# Patient Record
Sex: FEMALE | Race: WHITE | NOT HISPANIC OR LATINO | Employment: OTHER | URBAN - METROPOLITAN AREA
[De-identification: names, ages, dates, MRNs, and addresses within clinical notes are randomized per-mention and may not be internally consistent; named-entity substitution may affect disease eponyms.]

---

## 2023-04-06 PROBLEM — R00.1 BRADYCARDIA: Status: ACTIVE | Noted: 2023-04-06

## 2023-04-06 PROBLEM — R42 DIZZINESS: Status: ACTIVE | Noted: 2023-04-06

## 2023-04-06 PROBLEM — R09.81 NASAL CONGESTION: Status: ACTIVE | Noted: 2023-04-06

## 2023-04-06 PROBLEM — N93.0 POSTCOITAL BLEEDING: Status: ACTIVE | Noted: 2023-04-06

## 2023-04-06 PROBLEM — G43.909 MIGRAINE: Status: ACTIVE | Noted: 2023-04-06

## 2023-04-06 PROBLEM — G47.00 INSOMNIA, PERSISTENT: Status: ACTIVE | Noted: 2023-04-06

## 2023-04-06 PROBLEM — R53.83 FATIGUE: Status: ACTIVE | Noted: 2023-04-06

## 2023-04-06 PROBLEM — J30.89 ENVIRONMENTAL AND SEASONAL ALLERGIES: Status: ACTIVE | Noted: 2023-04-06

## 2023-04-06 PROBLEM — B37.31 YEAST INFECTION OF THE VAGINA: Status: ACTIVE | Noted: 2023-04-06

## 2023-04-06 PROBLEM — J02.9 SORE THROAT: Status: ACTIVE | Noted: 2023-04-06

## 2023-04-06 RX ORDER — CLONIDINE HYDROCHLORIDE 0.1 MG/1
0.1 TABLET ORAL EVERY EVENING
COMMUNITY
Start: 2022-09-12 | End: 2023-04-28 | Stop reason: ALTCHOICE

## 2023-04-06 RX ORDER — MAGNESIUM OXIDE 200 MG
TABLET,CHEWABLE ORAL
COMMUNITY
End: 2024-02-19 | Stop reason: WASHOUT

## 2023-04-06 RX ORDER — FLUOXETINE 10 MG/1
TABLET ORAL
COMMUNITY
End: 2023-04-28 | Stop reason: ALTCHOICE

## 2023-04-06 RX ORDER — ZOLPIDEM TARTRATE 6.25 MG/1
1-2 TABLET, FILM COATED, EXTENDED RELEASE ORAL NIGHTLY
COMMUNITY
Start: 2022-09-27 | End: 2023-04-28 | Stop reason: SDUPTHER

## 2023-04-06 RX ORDER — CLOPIDOGREL BISULFATE 75 MG/1
1 TABLET ORAL DAILY
COMMUNITY
Start: 2022-09-27 | End: 2023-04-28 | Stop reason: ALTCHOICE

## 2023-04-07 ENCOUNTER — NUTRITION (OUTPATIENT)
Dept: PRIMARY CARE | Facility: CLINIC | Age: 33
End: 2023-04-07
Payer: COMMERCIAL

## 2023-04-07 DIAGNOSIS — Z71.3 DIETARY COUNSELING: Primary | ICD-10-CM

## 2023-04-07 PROCEDURE — 97803 MED NUTRITION INDIV SUBSEQ: CPT | Performed by: DIETITIAN, REGISTERED

## 2023-04-07 NOTE — PROGRESS NOTES
"Assessment     Reason for Visit:  Mildred Spencer is a 32 y.o. female who presents for Nutrition Counseling (AN)    Anthropometrics:            Food And Nutrient Intake:  Food and Nutrient History  Food and Nutrient History: Pt is using RR most days - noticing more restriction recently 2/2 fear of weight gain, summer clothing. At 25-45% of kcal goals most days. Skipped two meals yesterday. Eating breakfast is difficult - ED likes to fast for longer times - but notices that rest of meals are easier if she can eat breakfast. Stress is making ED louder. Aware of ED and healthy voice. Feels frustrated she isn't \"farther along in recovery.\" Consuming negative BI thoughts most days right now. Struggles with variety/getting bored with meals right now (sheet pan meals, chicken wraps, chicken salad).  Energy Intake: Poor < 50 %  GI Symptoms: bloating, early satiety, abdominal pain, increased gas  GI Symptoms greater than 2 weeks: yes          Food Preparation  Cooking: Patient, Spouse/Significant Other  Grocery Shopping: Spouse/Significant Other, Patient                                                   Food And Nutrient Administration:       Diet Experience  Dieting Attempts: ED most of her life - restriction, compulsive exercise - had MI last year. Chronic dieting and DE behaviors since age 11                Factors:                         Physical Activities:  Physical Activity  Physical Activity History: Highly tied to restriction, ED - avoiding now 2/2 too significant of a trigger for her ED           Knowledge Beliefs Attitudes and Behavior       Beliefs and Attitudes  Beliefs and Attitudes: Preoccupation with food, Self-talk/cognitions, Emotions, Preoccupation with weight, Distorted body image, Readiness to change nutrition-related behaviors (SOC: contemplative)         Avoidance Behavior  Avoidance: Specific foods, Food groups  Restrictive Eating: Yes  Cause of Avoidance Behavior: AN         Mealtime " Behavior  Rumination: Yes (cuts food into small pieces)           Nutrition Focused Physical Exam:           Energy Needs           Diagnosis      Nutrition Diagnosis  Patient has Nutrition Diagnosis: Yes  Nutrition Diagnosis 1: Inadequate energy intake  Related to (1): AN  As Evidenced by (1): RR food log, at 25-40% kcal intake most days    Interventions/Recommendations   Nutrition Education  Nutrition Education Content: Content related nutrition education  Goals: Education on GI symptoms in nutrition rehabiliation, eating q 3 hours and why this is important. Explored how BI/ED thoughts show up when stress is higher or life feels out of her control. Unpacked ideas for variety at lunches: adding sauces and dressings, [facility] website. Discussed importance of breakfast: to have granola bar, pb toast with banana or instant oatmeal with pb. Education on malnutrition symptoms, impact on health and how RAVES, nutrition rehab will support these symptoms/body processes.  Nutrition Counseling  Strategies: Nutrition counseling based on motivational interviewing strategy, Nutrition counseling based on goal setting strategy  Goals: Explored motivation to change, recover. Naming her progress and wins, ability to notice ED and healthy voice. Explored thanking parts of her body for what they do for her. Unpacked her motivation to eat breakfast with her son - being a mom is very motivating for recovery - will continue to explore together.  Coordination of Nutrition Care by a Nutrition Professional  Goals: RDN to establish/collaborate w/ therapist: America Liu at [facility] [employer]        There are no Patient Instructions on file for this visit.    Monitoring and Evaluation   Food/Nutrient Related History Monitoring  Monitoring and Evaluation Plan: Energy intake, Meal/snack pattern  Knowledge/Beliefs/Attitudes  Monitoring and Evaluation Plan: Beliefs and attitudes, Food and nutrition knowledge, Physical activity  Biochemical Data, Medical  Tests and Procedures  Monitoring and Evaluation Plan: GI profile, Nutritional anemia profile        Time Spent  Time spent directly with patient, family or caregiver: 55 minutes  Documentation Time: 15 minutes        Readiness to Change : Good  Level of Understanding : Excellent  Anticipated Compliant : Good

## 2023-04-21 ENCOUNTER — APPOINTMENT (OUTPATIENT)
Dept: PRIMARY CARE | Facility: CLINIC | Age: 33
End: 2023-04-21
Payer: COMMERCIAL

## 2023-04-28 ENCOUNTER — OFFICE VISIT (OUTPATIENT)
Dept: PRIMARY CARE | Facility: CLINIC | Age: 33
End: 2023-04-28
Payer: COMMERCIAL

## 2023-04-28 VITALS
DIASTOLIC BLOOD PRESSURE: 62 MMHG | BODY MASS INDEX: 30.03 KG/M2 | WEIGHT: 169.5 LBS | OXYGEN SATURATION: 94 % | HEIGHT: 63 IN | SYSTOLIC BLOOD PRESSURE: 106 MMHG | HEART RATE: 77 BPM

## 2023-04-28 DIAGNOSIS — G47.00 INSOMNIA, PERSISTENT: Primary | ICD-10-CM

## 2023-04-28 LAB
AMPHETAMINE (PRESENCE) IN URINE BY SCREEN METHOD: NORMAL
BARBITURATES PRESENCE IN URINE BY SCREEN METHOD: NORMAL
BENZODIAZEPINE (PRESENCE) IN URINE BY SCREEN METHOD: NORMAL
CANNABINOIDS IN URINE BY SCREEN METHOD: NORMAL
COCAINE (PRESENCE) IN URINE BY SCREEN METHOD: NORMAL
DRUG SCREEN COMMENT URINE: NORMAL
FENTANYL URINE: NORMAL
METHADONE (PRESENCE) IN URINE BY SCREEN METHOD: NORMAL
OPIATES (PRESENCE) IN URINE BY SCREEN METHOD: NORMAL
OXYCODONE (PRESENCE) IN URINE BY SCREEN METHOD: NORMAL
PHENCYCLIDINE (PRESENCE) IN URINE BY SCREEN METHOD: NORMAL

## 2023-04-28 PROCEDURE — 80307 DRUG TEST PRSMV CHEM ANLYZR: CPT

## 2023-04-28 PROCEDURE — 1036F TOBACCO NON-USER: CPT | Performed by: FAMILY MEDICINE

## 2023-04-28 PROCEDURE — 99213 OFFICE O/P EST LOW 20 MIN: CPT | Performed by: FAMILY MEDICINE

## 2023-04-28 RX ORDER — SERTRALINE HYDROCHLORIDE 50 MG/1
1 TABLET, FILM COATED ORAL DAILY
COMMUNITY
Start: 2023-04-01 | End: 2023-06-12 | Stop reason: ALTCHOICE

## 2023-04-28 RX ORDER — ZOLPIDEM TARTRATE 6.25 MG/1
6.25 TABLET, FILM COATED, EXTENDED RELEASE ORAL NIGHTLY
Qty: 30 TABLET | Refills: 2 | Status: SHIPPED | OUTPATIENT
Start: 2023-04-28 | End: 2023-12-13 | Stop reason: ALTCHOICE

## 2023-04-28 ASSESSMENT — ENCOUNTER SYMPTOMS
PALPITATIONS: 0
FATIGUE: 0
CONSTIPATION: 0
DIARRHEA: 0
CHEST TIGHTNESS: 0
ABDOMINAL PAIN: 0
SLEEP DISTURBANCE: 1
COUGH: 0
HEADACHES: 1
SHORTNESS OF BREATH: 0

## 2023-04-28 ASSESSMENT — PATIENT HEALTH QUESTIONNAIRE - PHQ9
2. FEELING DOWN, DEPRESSED OR HOPELESS: NOT AT ALL
1. LITTLE INTEREST OR PLEASURE IN DOING THINGS: NOT AT ALL
SUM OF ALL RESPONSES TO PHQ9 QUESTIONS 1 AND 2: 0

## 2023-04-28 NOTE — PROGRESS NOTES
OARRS:  Adam Pino MD on 4/28/2023  2:41 PM  I have personally reviewed the OARRS report for Mildred Spencer. I have considered the risks of abuse, dependence, addiction and diversion    Is the patient prescribed a combination of a benzodiazepine and opioid?  No    Last Urine Drug Screen / ordered today: Yes  No results found for this or any previous visit (from the past 84702 hour(s)).  N/A    Controlled Substance Agreement:  Date of the Last Agreement: 4/28/2023  Reviewed Controlled Substance Agreement including but not limited to the benefits, risks, and alternatives to treatment with a Controlled Substance medication(s).    Sleep Aids:   What is the patient's goal of therapy? Better sleep  Is this being achieved with current treatment? yes    Activities of Daily Living:   Is your overall impression that this patient is benefiting (symptom reduction outweighs side effects) from sleep aid therapy? Yes     1. Physical Functioning: Better  2. Family Relationship: Better  3. Social Relationship: Better  4. Mood: Better  5. Sleep Patterns: Better  6. Overall Function: Better  Subjective   Patient ID: Mildred Spencer is a 32 y.o. female who presents for Med Management.    HPI   Medicine is still helping sleep.  Psych tried different medications but it did not help.  It is mostly to help with eating not necessarily anxiety, Zoloft has helped the best but weight gain has been a problem.  If they can find another medicine that can help through psychiatry and she is stable on the medicine I can continue it.    Also following up with neurology for headaches.    I have personally reviewed the OARRS report for this patient. This report is viewed or scanned into the electronic medical record. I have considered the risks of abuse, dependence, addiction and diversion. I believe that it is clinically appropriate for this patient to be prescribed this medication.    UDS CSA done today.      Review of Systems  "  Constitutional:  Negative for fatigue.   Eyes:  Negative for visual disturbance.   Respiratory:  Negative for cough, chest tightness and shortness of breath.    Cardiovascular:  Negative for chest pain and palpitations.   Gastrointestinal:  Negative for abdominal pain, constipation and diarrhea.   Skin:  Negative for rash.   Neurological:  Positive for headaches.   Psychiatric/Behavioral:  Positive for sleep disturbance.        Objective   /62   Pulse 77   Ht 1.6 m (5' 3\")   Wt 76.9 kg (169 lb 8 oz)   LMP 04/27/2023   SpO2 94%   BMI 30.03 kg/m²     Physical Exam    Assessment/Plan   Problem List Items Addressed This Visit          Nervous    Insomnia, persistent - Primary    Relevant Medications    zolpidem CR (Ambien CR) 6.25 mg ER tablet          "

## 2023-05-05 ENCOUNTER — APPOINTMENT (OUTPATIENT)
Dept: PRIMARY CARE | Facility: CLINIC | Age: 33
End: 2023-05-05
Payer: COMMERCIAL

## 2023-05-19 ENCOUNTER — APPOINTMENT (OUTPATIENT)
Dept: PRIMARY CARE | Facility: CLINIC | Age: 33
End: 2023-05-19
Payer: COMMERCIAL

## 2023-06-12 ENCOUNTER — OFFICE VISIT (OUTPATIENT)
Dept: PRIMARY CARE | Facility: CLINIC | Age: 33
End: 2023-06-12
Payer: COMMERCIAL

## 2023-06-12 VITALS
WEIGHT: 171.9 LBS | DIASTOLIC BLOOD PRESSURE: 66 MMHG | BODY MASS INDEX: 30.46 KG/M2 | HEIGHT: 63 IN | HEART RATE: 79 BPM | OXYGEN SATURATION: 91 % | SYSTOLIC BLOOD PRESSURE: 108 MMHG

## 2023-06-12 DIAGNOSIS — Z00.00 ROUTINE GENERAL MEDICAL EXAMINATION AT A HEALTH CARE FACILITY: Primary | ICD-10-CM

## 2023-06-12 PROBLEM — R42 DIZZINESS: Status: RESOLVED | Noted: 2023-04-06 | Resolved: 2023-06-12

## 2023-06-12 PROBLEM — R09.81 NASAL CONGESTION: Status: RESOLVED | Noted: 2023-04-06 | Resolved: 2023-06-12

## 2023-06-12 PROBLEM — J45.990 EXERCISE-INDUCED ASTHMA (HHS-HCC): Status: ACTIVE | Noted: 2023-06-12

## 2023-06-12 PROBLEM — R53.83 FATIGUE: Status: RESOLVED | Noted: 2023-04-06 | Resolved: 2023-06-12

## 2023-06-12 PROBLEM — J02.9 SORE THROAT: Status: RESOLVED | Noted: 2023-04-06 | Resolved: 2023-06-12

## 2023-06-12 PROBLEM — B37.31 YEAST INFECTION OF THE VAGINA: Status: RESOLVED | Noted: 2023-04-06 | Resolved: 2023-06-12

## 2023-06-12 PROBLEM — R00.1 BRADYCARDIA: Status: RESOLVED | Noted: 2023-04-06 | Resolved: 2023-06-12

## 2023-06-12 PROBLEM — G47.00 INSOMNIA, PERSISTENT: Status: RESOLVED | Noted: 2023-04-06 | Resolved: 2023-06-12

## 2023-06-12 PROBLEM — N93.0 POSTCOITAL BLEEDING: Status: RESOLVED | Noted: 2023-04-06 | Resolved: 2023-06-12

## 2023-06-12 PROCEDURE — 99395 PREV VISIT EST AGE 18-39: CPT | Performed by: FAMILY MEDICINE

## 2023-06-12 PROCEDURE — 1036F TOBACCO NON-USER: CPT | Performed by: FAMILY MEDICINE

## 2023-06-12 RX ORDER — ALBUTEROL SULFATE 90 UG/1
2 AEROSOL, METERED RESPIRATORY (INHALATION) EVERY 6 HOURS PRN
Qty: 18 G | Refills: 11 | Status: SHIPPED | OUTPATIENT
Start: 2023-06-12 | End: 2024-06-11

## 2023-06-12 ASSESSMENT — ENCOUNTER SYMPTOMS
COUGH: 1
SHORTNESS OF BREATH: 1
FATIGUE: 0
DIARRHEA: 0
PALPITATIONS: 0
HEADACHES: 0
CHEST TIGHTNESS: 1
CONSTIPATION: 0
ABDOMINAL PAIN: 0

## 2023-06-12 ASSESSMENT — PATIENT HEALTH QUESTIONNAIRE - PHQ9
1. LITTLE INTEREST OR PLEASURE IN DOING THINGS: NOT AT ALL
SUM OF ALL RESPONSES TO PHQ9 QUESTIONS 1 AND 2: 0
2. FEELING DOWN, DEPRESSED OR HOPELESS: NOT AT ALL

## 2023-06-12 NOTE — PROGRESS NOTES
"Subjective   Patient ID: Mildred Spencer is a 33 y.o. female who presents for Annual Exam.    HPI   EIA - prn HFA for now.  Will hold singulair.  Saw cardiology and that she had a clean arteries.  She stopped the Plavix.  Was never on statin.  I agree with her stopping his medications.  She is also off the Zoloft and doing well.  If we need to start something in the future, would recommend low-dose Celexa.      Review of Systems   Constitutional:  Negative for fatigue.   Eyes:  Negative for visual disturbance.   Respiratory:  Positive for cough, chest tightness and shortness of breath.    Cardiovascular:  Negative for chest pain and palpitations.   Gastrointestinal:  Negative for abdominal pain, constipation and diarrhea.   Skin:  Negative for rash.   Neurological:  Negative for headaches.       Objective   /66   Pulse 79   Ht 1.6 m (5' 3\")   Wt 78 kg (171 lb 14.4 oz)   LMP 05/29/2023   SpO2 91%   BMI 30.45 kg/m²     Physical Exam  Constitutional:       Appearance: Normal appearance.   HENT:      Head: Normocephalic and atraumatic.   Cardiovascular:      Rate and Rhythm: Normal rate and regular rhythm.      Heart sounds: Normal heart sounds.   Pulmonary:      Effort: Pulmonary effort is normal.      Breath sounds: Normal breath sounds.   Skin:     General: Skin is warm and dry.   Neurological:      General: No focal deficit present.      Mental Status: She is alert and oriented to person, place, and time.      Gait: Gait normal.   Psychiatric:         Mood and Affect: Mood normal.         Behavior: Behavior normal.         Thought Content: Thought content normal.         Judgment: Judgment normal.         Assessment/Plan   Problem List Items Addressed This Visit    None  Visit Diagnoses       Routine general medical examination at a health care facility    -  Primary    Relevant Medications    albuterol 90 mcg/actuation inhaler               "

## 2023-06-27 ENCOUNTER — APPOINTMENT (OUTPATIENT)
Dept: PRIMARY CARE | Facility: CLINIC | Age: 33
End: 2023-06-27
Payer: COMMERCIAL

## 2023-10-25 ENCOUNTER — TELEPHONE (OUTPATIENT)
Dept: NEUROLOGY | Facility: CLINIC | Age: 33
End: 2023-10-25
Payer: COMMERCIAL

## 2023-12-13 ENCOUNTER — OFFICE VISIT (OUTPATIENT)
Dept: OBSTETRICS AND GYNECOLOGY | Facility: CLINIC | Age: 33
End: 2023-12-13
Payer: COMMERCIAL

## 2023-12-13 VITALS
WEIGHT: 174 LBS | SYSTOLIC BLOOD PRESSURE: 112 MMHG | BODY MASS INDEX: 30.83 KG/M2 | HEIGHT: 63 IN | DIASTOLIC BLOOD PRESSURE: 76 MMHG

## 2023-12-13 DIAGNOSIS — N64.4 BREAST PAIN, LEFT: ICD-10-CM

## 2023-12-13 DIAGNOSIS — Z12.4 CERVICAL CANCER SCREENING: Primary | ICD-10-CM

## 2023-12-13 DIAGNOSIS — Z11.3 SCREEN FOR STD (SEXUALLY TRANSMITTED DISEASE): ICD-10-CM

## 2023-12-13 DIAGNOSIS — N76.0 ACUTE VAGINITIS: ICD-10-CM

## 2023-12-13 DIAGNOSIS — R30.0 DYSURIA: ICD-10-CM

## 2023-12-13 PROCEDURE — 99395 PREV VISIT EST AGE 18-39: CPT | Performed by: OBSTETRICS & GYNECOLOGY

## 2023-12-13 PROCEDURE — 87624 HPV HI-RISK TYP POOLED RSLT: CPT

## 2023-12-13 PROCEDURE — 87070 CULTURE OTHR SPECIMN AEROBIC: CPT

## 2023-12-13 PROCEDURE — 87661 TRICHOMONAS VAGINALIS AMPLIF: CPT

## 2023-12-13 PROCEDURE — 88142 CYTOPATH C/V THIN LAYER: CPT

## 2023-12-13 PROCEDURE — 88141 CYTOPATH C/V INTERPRET: CPT | Performed by: PATHOLOGY

## 2023-12-13 PROCEDURE — 87086 URINE CULTURE/COLONY COUNT: CPT

## 2023-12-13 PROCEDURE — 1036F TOBACCO NON-USER: CPT | Performed by: OBSTETRICS & GYNECOLOGY

## 2023-12-13 PROCEDURE — 87800 DETECT AGNT MULT DNA DIREC: CPT

## 2023-12-13 NOTE — PROGRESS NOTES
Subjective   Patient ID: Mildred Spencer is a 33 y.o. female who presents for Gynecologic Exam (Patient is here for a yearly exam. Belinda has c/o Left Breast pain that has been going on for about 3 weeks. LMP 11/08/2023).  HPI    Mildred is a 33-year-old who comes in for routine GYN exam.  She has had left breast pain for about 3 weeks.  It is constant but today she is not feeling it.  She denies any change in physical activities change in medications or caffeine levels.  She has not palpated any abnormalities on that breast.  Patient reports that last year she had a heart attack but despite a cardiac workup they were unable to determine the etiology of it.  She had been on blood thinners but was told that she could discontinue it because there was no evidence of any benefit from it.  She is not taking any preventative medications including a baby aspirin.  Additionally the patient reports a history of pelvic pain with intercourse.  She reports a family history of endometriosis and reports that she was supposed to have a laparoscopy out in [place] but then had this cardiac event and her surgery was canceled.  She reports that she is not sexually active currently secondary to pain associated with intercourse.  She reports that an ultrasound about a year ago was benign but we do not have records the only thing she was told by the gynecologist was that her ovary was adjacent to her uterus .  She reports a history of postcoital bleeding and a negative workup but eventually she reports that her gynecologist did some type of procedure on her that caused the bleeding to stop.    Review of Systems  No chest pain or palpitations cardiovascular   Respiratory denies any shortness of breath   GI denies any changes in bowel habits   GYN Per HPI   Musculoskeletal denies any changes in her mobility   Psych denies any changes in her mood or sleep     Physical Exam  Young in no acute distress  Head and neck no lesions supple  without adenopathy  Heart regular rate and rhythm  Lungs clear to auscultation  Breast symmetrical no masses discharge retraction or skin changes  Abdomen soft nontender  External genitalia reveals no lesions the vagina was well estrogenized there was blood in her vault  Extremities good mobility  Psych appropriately oriented  Assessment/Plan     Mildred is a 33-year-old woman who comes in for routine GYN exam.  Pap smear was done.  Patient complaining of breast pain but breast exam was completely benign we will get a diagnostic mammogram to confirm there is no abnormality in the breast.  Patient with a history of myocardial infarction but of unclear etiology therefore suspect it was probably angina related and advised her to take a baby aspirin in the event that she were to have a recurrence.  Finally the patient is having some pelvic pain we will send a full set of cultures.       Suzette Ruggiero MD 12/13/23 2:05 PM

## 2023-12-14 ENCOUNTER — HOSPITAL ENCOUNTER (OUTPATIENT)
Dept: RADIOLOGY | Facility: HOSPITAL | Age: 33
Discharge: HOME | End: 2023-12-14
Payer: COMMERCIAL

## 2023-12-14 DIAGNOSIS — N64.4 BREAST PAIN, LEFT: ICD-10-CM

## 2023-12-14 LAB
C TRACH RRNA SPEC QL NAA+PROBE: NEGATIVE
N GONORRHOEA DNA SPEC QL PROBE+SIG AMP: NEGATIVE
T VAGINALIS RRNA SPEC QL NAA+PROBE: NEGATIVE

## 2023-12-14 PROCEDURE — 76642 ULTRASOUND BREAST LIMITED: CPT | Mod: LT

## 2023-12-14 PROCEDURE — 76642 ULTRASOUND BREAST LIMITED: CPT | Performed by: RADIOLOGY

## 2023-12-14 PROCEDURE — 77062 BREAST TOMOSYNTHESIS BI: CPT | Performed by: RADIOLOGY

## 2023-12-14 PROCEDURE — 77066 DX MAMMO INCL CAD BI: CPT | Performed by: RADIOLOGY

## 2023-12-14 PROCEDURE — 77066 DX MAMMO INCL CAD BI: CPT

## 2023-12-15 LAB — BACTERIA UR CULT: NORMAL

## 2023-12-18 ENCOUNTER — PROCEDURE VISIT (OUTPATIENT)
Dept: NEUROLOGY | Facility: CLINIC | Age: 33
End: 2023-12-18
Payer: COMMERCIAL

## 2023-12-18 ENCOUNTER — SPECIALTY PHARMACY (OUTPATIENT)
Dept: PHARMACY | Facility: CLINIC | Age: 33
End: 2023-12-18

## 2023-12-18 DIAGNOSIS — G43.709 CHRONIC MIGRAINE W/O AURA W/O STATUS MIGRAINOSUS, NOT INTRACTABLE: Primary | ICD-10-CM

## 2023-12-18 PROCEDURE — RXMED WILLOW AMBULATORY MEDICATION CHARGE

## 2023-12-18 PROCEDURE — 64615 CHEMODENERV MUSC MIGRAINE: CPT | Performed by: STUDENT IN AN ORGANIZED HEALTH CARE EDUCATION/TRAINING PROGRAM

## 2023-12-18 PROCEDURE — 99213 OFFICE O/P EST LOW 20 MIN: CPT | Performed by: STUDENT IN AN ORGANIZED HEALTH CARE EDUCATION/TRAINING PROGRAM

## 2023-12-18 RX ORDER — TOPIRAMATE 25 MG/1
TABLET ORAL
Qty: 60 TABLET | Refills: 6 | Status: SHIPPED | OUTPATIENT
Start: 2023-12-18 | End: 2024-03-18 | Stop reason: SDUPTHER

## 2023-12-18 NOTE — PROGRESS NOTES
Neurology Botulinum Injection    Subjective   Mildred Spencer is an 33 y.o. female here for medical botulinum injection for Chronic migraine w/o aura w/o status migrainosus, not intractable [G43.709].     Patient states that since last Botox, has only a few headache free days per month, having at least 27/30 HA days per month. Overall severity is improved by >50% compared to prior to Botox. Notes increased stressors of final, graduation, and recent death in the family. Denies any side effects to Botox. Nurtec effective for breakthrough headaches.    Reports that headaches seemed to be better controlled while on Botox and topiramate. Would like to restart topiramate at this time.    Head/Face/Jaw Botulinum Injection    Date/Time: 12/18/2023 11:23 AM    Performed by: Chino Garcia DO  Authorized by: Chino Garcia DO      Procedure details:        Total units injected:  0     Total units wasted:  0    Comments: Procedure Note: PREEMPT Botox    Indications: Chronic Migraine    Informed consent was obtained (explaining the procedure and risks and benefits of procedure) from patient: the signed consent form was placed in the medical record.  A time out was completed, verifying correct patient, procedure,site, positioning, and implants or special equipment.    200 units of OnabotulinumtoxinA were reconstituted with saline. Sites of injection were identified via PREEMPT protocol and cleaned with alcohol pads. Using a 30 gauge needle, patient received following injections:    5 units in procerus  5 units in each left and right   10 units divided between 2 sites of L frontalis  10 units divided between 2 sites of R frontalis  20 units divided between 4 sites of L temporalis  20 units divided between 4 sites of R temporalis  15 units divided between 3 sites of L occipitalis  15 units divided between 3 sites of R occipitalis  10 units divided between 2 sites of L paracervical muscles  10 units divided between 2  sites of R paracervical muscles  15 units divided between 3 sites of L trapezius  15 units divided between 3 sites of R trapezius    Additional Sites:  10u R temporalis  10u L temporalis  10u R occipitalis  10u L occipitalis    Used: 195u  Wasted: 5u    There were no complications. Patient was comfortable and left without complaint.     OnabotulinumtoxinA  Lot #: [id number]  Exp: 3/2026        Assessment/Plan   Patient with chronic migraine wwo aura. Botox reduced severity by >50%, but continues to have high frequency. Per our discussion, will continue Botox for migraine ppx but restart topiramate. Will consider MAB at next appointment should topiramate not provide adequate relief or has side effect. Will continue nurtec for breakthrough headaches.    - continue Botox   - start topiramate 50mg qhs  - continue Nurtec 75mg PRN

## 2023-12-19 LAB — BACTERIA GENITAL AEROBE CULT: NORMAL

## 2023-12-21 ENCOUNTER — PHARMACY VISIT (OUTPATIENT)
Dept: PHARMACY | Facility: CLINIC | Age: 33
End: 2023-12-21
Payer: COMMERCIAL

## 2023-12-27 LAB
CYTOLOGY CMNT CVX/VAG CYTO-IMP: NORMAL
HPV HR 12 DNA GENITAL QL NAA+PROBE: NEGATIVE
HPV HR GENOTYPES PNL CVX NAA+PROBE: NEGATIVE
HPV16 DNA SPEC QL NAA+PROBE: NEGATIVE
HPV18 DNA SPEC QL NAA+PROBE: NEGATIVE
LAB AP HPV GENOTYPE QUESTION: YES
LAB AP HPV HR: NORMAL
LABORATORY COMMENT REPORT: NORMAL
LABORATORY COMMENT REPORT: NORMAL
LMP START DATE: NORMAL
PATH REPORT.TOTAL CANCER: NORMAL

## 2024-01-22 ENCOUNTER — OFFICE VISIT (OUTPATIENT)
Dept: PRIMARY CARE | Facility: CLINIC | Age: 34
End: 2024-01-22
Payer: COMMERCIAL

## 2024-01-22 VITALS
WEIGHT: 172.8 LBS | DIASTOLIC BLOOD PRESSURE: 68 MMHG | SYSTOLIC BLOOD PRESSURE: 116 MMHG | HEIGHT: 63 IN | OXYGEN SATURATION: 97 % | HEART RATE: 83 BPM | BODY MASS INDEX: 30.62 KG/M2

## 2024-01-22 DIAGNOSIS — J45.990 EXERCISE-INDUCED ASTHMA (HHS-HCC): ICD-10-CM

## 2024-01-22 DIAGNOSIS — J02.9 SORE THROAT: ICD-10-CM

## 2024-01-22 PROCEDURE — G0444 DEPRESSION SCREEN ANNUAL: HCPCS | Performed by: NURSE PRACTITIONER

## 2024-01-22 PROCEDURE — 87081 CULTURE SCREEN ONLY: CPT

## 2024-01-22 PROCEDURE — 99213 OFFICE O/P EST LOW 20 MIN: CPT | Performed by: NURSE PRACTITIONER

## 2024-01-22 PROCEDURE — 1036F TOBACCO NON-USER: CPT | Performed by: NURSE PRACTITIONER

## 2024-01-22 RX ORDER — LANOLIN ALCOHOL/MO/W.PET/CERES
1000 CREAM (GRAM) TOPICAL 2 TIMES DAILY
COMMUNITY
End: 2024-02-19 | Stop reason: WASHOUT

## 2024-01-22 ASSESSMENT — ENCOUNTER SYMPTOMS
COUGH: 0
SWOLLEN GLANDS: 1
CHILLS: 0
DIARRHEA: 0
TROUBLE SWALLOWING: 1
HEADACHES: 1
HOARSE VOICE: 1
SORE THROAT: 1
FEVER: 0

## 2024-01-22 ASSESSMENT — PATIENT HEALTH QUESTIONNAIRE - PHQ9
2. FEELING DOWN, DEPRESSED OR HOPELESS: NOT AT ALL
SUM OF ALL RESPONSES TO PHQ9 QUESTIONS 1 AND 2: 0
1. LITTLE INTEREST OR PLEASURE IN DOING THINGS: NOT AT ALL

## 2024-01-22 NOTE — LETTER
January 22, 2024     Patient: Mildred Spencer   YOB: 1990   Date of Visit: 1/22/2024       To Whom It May Concern:    Mildred Spencer was seen in my clinic on 1/22/2024 at 11:20 am. Please excuse Mildred for her absence from work on this day to make the appointment and on 01/23/2024.    If you have any questions or concerns, please don't hesitate to call.         Sincerely,         HINA Jordan-CNP        CC: No Recipients

## 2024-01-22 NOTE — PATIENT INSTRUCTIONS
Referral to Dr. Zafar for frequent strep infections.  A confirmatory strep has been sent to the lab and we will notify you when those results are available.  Work excuse for 1/23/2024.  Continue with supportive care such as ibuprofen for your throat pain you also may use 1/8 tsp salt w/ 1 cup warm water to gargle your throat and help with the pain

## 2024-01-22 NOTE — PROGRESS NOTES
"Subjective   Patient ID: Mildred Spencer is a 33 y.o. female who presents for Sore Throat (X 2 days, nausea ).    Mildred comes to the office today for a sore throat and nausea.  Started approximately 2 days.  Home COVID test negative. No ill exposures. Gets freq strep throat (3x last yr) Tonsils present.   Depression screening completed today.  PHQ 2 score: 0. Re-evaluate annually and as needed.  LMP: last wk  No f/c + nausea. No vomiting. No cough. Appetite: as per her usual  Eyes feel glassy    Sore Throat   This is a new problem. The current episode started in the past 7 days. The problem has been gradually worsening. Neither side of throat is experiencing more pain than the other. There has been no fever. Associated symptoms include headaches, a hoarse voice, swollen glands and trouble swallowing. Pertinent negatives include no coughing, diarrhea or plugged ear sensation. She has tried NSAIDs (throat care tea) for the symptoms. The treatment provided mild relief.        Review of Systems   Constitutional:  Negative for chills and fever.   HENT:  Positive for hoarse voice, sore throat and trouble swallowing.    Respiratory:  Negative for cough.    Gastrointestinal:  Negative for diarrhea.   Neurological:  Positive for headaches.       Objective   /68   Pulse 83   Ht 1.6 m (5' 3\")   Wt 78.4 kg (172 lb 12.8 oz)   SpO2 97%   BMI 30.61 kg/m²     Physical Exam  Vitals reviewed.   HENT:      Right Ear: Tympanic membrane normal.      Left Ear: Tympanic membrane normal.      Mouth/Throat:      Pharynx: Posterior oropharyngeal erythema present. No pharyngeal swelling or oropharyngeal exudate.   Cardiovascular:      Rate and Rhythm: Normal rate and regular rhythm.      Heart sounds: Normal heart sounds.   Pulmonary:      Effort: Pulmonary effort is normal.      Breath sounds: Normal breath sounds.   Lymphadenopathy:      Cervical: Cervical adenopathy present.      Left cervical: Superficial cervical " adenopathy present.   Neurological:      Mental Status: She is alert.   Psychiatric:         Behavior: Behavior is cooperative.         Assessment/Plan   Problem List Items Addressed This Visit             ICD-10-CM    Exercise-induced asthma J45.990     Other Visit Diagnoses         Codes    Sore throat     J02.9    Check confirmatory strep test.  Supportive care at this time.  Referral to ENT for frequent strep infections     Relevant Orders    Group A Streptococcus, Culture    Referral to ENT

## 2024-01-25 ENCOUNTER — TELEPHONE (OUTPATIENT)
Dept: PRIMARY CARE | Facility: CLINIC | Age: 34
End: 2024-01-25
Payer: COMMERCIAL

## 2024-01-25 LAB — S PYO THROAT QL CULT: NORMAL

## 2024-01-25 NOTE — TELEPHONE ENCOUNTER
----- Message from QUANG Jordan sent at 1/25/2024  8:12 AM EST -----  Please call patient and notify her that her strep test confirmatory is negative.

## 2024-02-02 ENCOUNTER — OFFICE VISIT (OUTPATIENT)
Dept: PRIMARY CARE | Facility: CLINIC | Age: 34
End: 2024-02-02
Payer: COMMERCIAL

## 2024-02-02 VITALS
OXYGEN SATURATION: 98 % | HEIGHT: 63 IN | DIASTOLIC BLOOD PRESSURE: 73 MMHG | WEIGHT: 166.39 LBS | BODY MASS INDEX: 29.48 KG/M2 | SYSTOLIC BLOOD PRESSURE: 112 MMHG | HEART RATE: 96 BPM

## 2024-02-02 DIAGNOSIS — R13.14 PHARYNGOESOPHAGEAL DYSPHAGIA: ICD-10-CM

## 2024-02-02 DIAGNOSIS — K21.9 GASTROESOPHAGEAL REFLUX DISEASE WITHOUT ESOPHAGITIS: Primary | ICD-10-CM

## 2024-02-02 PROCEDURE — 1036F TOBACCO NON-USER: CPT | Performed by: FAMILY MEDICINE

## 2024-02-02 PROCEDURE — 99214 OFFICE O/P EST MOD 30 MIN: CPT | Performed by: FAMILY MEDICINE

## 2024-02-02 ASSESSMENT — ENCOUNTER SYMPTOMS
DIARRHEA: 1
ABDOMINAL PAIN: 0
CONSTIPATION: 1
NAUSEA: 1
VOMITING: 0
BLOOD IN STOOL: 0

## 2024-02-02 ASSESSMENT — PATIENT HEALTH QUESTIONNAIRE - PHQ9
SUM OF ALL RESPONSES TO PHQ9 QUESTIONS 1 AND 2: 0
1. LITTLE INTEREST OR PLEASURE IN DOING THINGS: NOT AT ALL
2. FEELING DOWN, DEPRESSED OR HOPELESS: NOT AT ALL

## 2024-02-02 NOTE — PROGRESS NOTES
"Subjective   Patient ID: Mildred Spencer is a 33 y.o. female who presents for Follow-up (ENT[facility]).    HPI   Sounds like for a long time she has had atypical symptoms of heartburn.  In the workup of the tonsillitis, it is found that she has some irritation likely from chronic reflux.  She does occasionally get classical symptoms of heartburn but most the time not.  She has not felt all that much better with the omeprazole 40 mg daily for the last week.  Not a big change in her symptoms yet.  She does have some dysphagia.    Continue the omeprazole for now.  She is going to have a tonsillectomy in roughly 3 weeks.  She will call about that time to see if she is getting him improvement with the omeprazole.  Pantoprazole is covered if we need it.  If there has not been a big change, consider GI referral.  Office visit 6 weeks    Review of Systems   Gastrointestinal:  Positive for constipation, diarrhea and nausea. Negative for abdominal pain, blood in stool and vomiting.       Objective   /73   Pulse 96   Ht 1.6 m (5' 3\")   Wt 75.5 kg (166 lb 6.2 oz)   SpO2 98%   BMI 29.47 kg/m²     Physical Exam  Constitutional:       Appearance: Normal appearance.   HENT:      Head: Normocephalic and atraumatic.   Abdominal:      Palpations: Abdomen is soft.      Tenderness: There is no abdominal tenderness.   Skin:     General: Skin is warm and dry.   Neurological:      General: No focal deficit present.      Mental Status: She is alert and oriented to person, place, and time.   Psychiatric:         Mood and Affect: Mood normal.         Behavior: Behavior normal.         Thought Content: Thought content normal.         Judgment: Judgment normal.         Assessment/Plan   Problem List Items Addressed This Visit             ICD-10-CM    Gastroesophageal reflux disease without esophagitis - Primary K21.9    Pharyngoesophageal dysphagia R13.14          "

## 2024-02-12 ENCOUNTER — LAB (OUTPATIENT)
Dept: LAB | Facility: LAB | Age: 34
End: 2024-02-12
Payer: COMMERCIAL

## 2024-02-12 DIAGNOSIS — J35.03 CHRONIC TONSILLITIS AND ADENOIDITIS: Primary | ICD-10-CM

## 2024-02-12 LAB
HCT VFR BLD AUTO: 41.5 % (ref 36–46)
HGB BLD-MCNC: 13.5 G/DL (ref 12–16)

## 2024-02-12 PROCEDURE — 85018 HEMOGLOBIN: CPT

## 2024-02-12 PROCEDURE — 85014 HEMATOCRIT: CPT

## 2024-02-12 PROCEDURE — 36415 COLL VENOUS BLD VENIPUNCTURE: CPT

## 2024-02-18 ENCOUNTER — PREP FOR PROCEDURE (OUTPATIENT)
Dept: OTOLARYNGOLOGY | Facility: HOSPITAL | Age: 34
End: 2024-02-18
Payer: COMMERCIAL

## 2024-02-18 DIAGNOSIS — J35.03 TONSILLITIS AND ADENOIDITIS, CHRONIC: Primary | ICD-10-CM

## 2024-02-18 NOTE — H&P (VIEW-ONLY)
HISTORY AND PHYSICAL    DATE: .2024  PATIENT'S NAME: Mildred MCCONNELL   :1990  SSN:     NP - sore throat    Recurrent Tonsillitis    Diagnosis was made by a medical provider.  Patient presents today for a new complaint of tonsillitis.  Severity:  Severity is severe.    Duration:  Onset in childhood.  Timing:  Her swelling is intermittent.  Pattern of Development: Symptoms developed very rapidly.  Frequency: Patient has experienced her symptoms many times. Patient has experienced her symptoms 3-4 times a year.  Modifying Factors:  Aggravating Factors:  Patient denies any aggravating factors.  Relieving Factors:  Patient denies any relieving factors.      Associated Symptoms:  Dysphagia. Fever. Odynophagia. Oral pain. Throat pain. constant throat clearing burping and hoarseness.  Prior Testing:  Rapid test for strep  Prior Treatment:  Prescription Meds. many antibiotics  omeprazole 40 mg capsule,delayed release - 30  Constitution:  General Appearance: Well developed, No acute distress.  Ability to Communicate: Normal ability to communicate.  Head, Face, Salivary Glands, and TMJ  Inspection of Head and Face: No significant scars, No lesions present.  Head/Face Palpation: No tenderness to percussion or pressure, Normal skeletal contour and stability.  Facial Strength and Mobility: Facial strength and mobility normal on left, Facial strength and mobility normal on right.  Temporomandibular Joints: No deviation.  Ears  External Ears: Left Pinna: Normal helical rim, antithetical rim, conchal bowl, lobule, tragus, and external meatus., Right pinna: normal helical rim, antithetical rim, conchal bowl, lobule, tragus, and external meatus..  Hearing Assessment: Normal to conversational voice.  Otoscopic Exam: Left external auditory canal normal, Left tympanic membrane normal. No middle ear effusion. Ossicles noted., Right external auditory canal normal, Right tympanic membrane normal. No middle ear effusion.  Ossicles noted.  Nose  Nasal Interior: Nasal septum normal, Turbinates normal size and symmetrical bilaterally, No rhinorrhea, Normal mucosa with no swelling, polyps, active bleeding or evidence of bleeding.  External Nose: Nasal skin normal, Normal dorsum.  Mouth and Throat  Lips, Teeth, and Gums: Lips normal.  Base of Tongue: Normal lingual tonsil size, Base of tongue supple.  Vallecula: No edema, No erythema.  Pyriform Sinuses: No pooling of secretions, No erythema.  Larynx: General appearance normal, Normal epiglottis, False vocal cords normal, True vocal cords normal, Abnormal Interarytenoid space, MOD PICKLING.  Oral Cavity and Oropharynx: Abnormal tonsils, tonsils 2+, Soft palate normal, Posterior pharynx normal, Oral mucosa with normal color and moisture, No mucosal lesions, Anterior two thirds of tongue normal, Hard palate normal, Parotid duct puncta normal.  Hypopharyngeal Walls: Walls symmetrical.  Neck and Thyroid  Neck: Normal symmetry, Soft tissue crepitation, No palpable anterior or posterior lymphadenopathy, No neck masses, No skin lesions.  Thyroid: No hypertrophy.  Respiratory  Chest Inspection: No deformities noted, Normal expansion, Normal to percussion, Auscultation of lungs normal.  Respiratory Assessment: Effort normal.  Cardiovascular  Auscultation: Regular rate and rhythm, normal S1, S2, no murmur or abnormal sounds., No murmurs.  Lymphatic  Right Neck Nodes: Nontender to palpation, Normal consistency, Normal size.  Left Neck Nodes: Nontender to palpation, Normal consistency, Normal size.  Neurologic  Cranial Nerves: II-XII grossly intact and symmetrical.  Diagnosis  : Chronic tonsillitis and adenoiditis  K219: Gastro-esophageal reflux disease without esophagitis  J370: Chronic laryngitis  R490: Dysphonia  Treatment Plan    Surgical Discussion:  It was recommended that the patient undergo a tonsillectomy and adenoidectomy. The risks and benefits were discussed with the patient,  including: bleeding, infection, change in voice, velopharyngeal insufficiency. The patient's questions regarding surgery were discussed in detail and their concerns were addressed. The patient provided informed consent and surgery will be scheduled in the near future.  Comments:Mildred certainly meets criteria for surgery for her recurrent adenotonsillitis and will get that surgery set up. Also has GERD/LPR and needs PPI.        Rodolfo Zafar M.D.

## 2024-02-19 ENCOUNTER — ANESTHESIA EVENT (OUTPATIENT)
Dept: OPERATING ROOM | Facility: HOSPITAL | Age: 34
End: 2024-02-19
Payer: COMMERCIAL

## 2024-02-19 NOTE — PREPROCEDURE INSTRUCTIONS
No outpatient medications have been marked as taking for the 2/21/24 encounter (Hospital Encounter).                       NPO Instructions:    Nothing to eat or drink after midnight    Additional Instructions:     Will need  home, will receive call day before surgery with arrival time

## 2024-02-21 ENCOUNTER — ANESTHESIA (OUTPATIENT)
Dept: OPERATING ROOM | Facility: HOSPITAL | Age: 34
End: 2024-02-21
Payer: COMMERCIAL

## 2024-02-21 ENCOUNTER — TELEPHONE (OUTPATIENT)
Dept: PRIMARY CARE | Facility: CLINIC | Age: 34
End: 2024-02-21
Payer: COMMERCIAL

## 2024-02-21 ENCOUNTER — HOSPITAL ENCOUNTER (OUTPATIENT)
Facility: HOSPITAL | Age: 34
Setting detail: OUTPATIENT SURGERY
Discharge: HOME | End: 2024-02-21
Attending: OTOLARYNGOLOGY | Admitting: OTOLARYNGOLOGY
Payer: COMMERCIAL

## 2024-02-21 VITALS
SYSTOLIC BLOOD PRESSURE: 106 MMHG | OXYGEN SATURATION: 100 % | RESPIRATION RATE: 16 BRPM | HEART RATE: 70 BPM | BODY MASS INDEX: 29.64 KG/M2 | DIASTOLIC BLOOD PRESSURE: 78 MMHG | WEIGHT: 167.33 LBS | TEMPERATURE: 97.7 F

## 2024-02-21 DIAGNOSIS — J35.03 TONSILLITIS AND ADENOIDITIS, CHRONIC: ICD-10-CM

## 2024-02-21 DIAGNOSIS — K21.9 GASTROESOPHAGEAL REFLUX DISEASE WITHOUT ESOPHAGITIS: Primary | ICD-10-CM

## 2024-02-21 PROCEDURE — 3600000003 HC OR TIME - INITIAL BASE CHARGE - PROCEDURE LEVEL THREE: Performed by: OTOLARYNGOLOGY

## 2024-02-21 PROCEDURE — 3700000001 HC GENERAL ANESTHESIA TIME - INITIAL BASE CHARGE: Performed by: OTOLARYNGOLOGY

## 2024-02-21 PROCEDURE — 7100000010 HC PHASE TWO TIME - EACH INCREMENTAL 1 MINUTE: Performed by: OTOLARYNGOLOGY

## 2024-02-21 PROCEDURE — 2500000005 HC RX 250 GENERAL PHARMACY W/O HCPCS: Performed by: OTOLARYNGOLOGY

## 2024-02-21 PROCEDURE — 88304 TISSUE EXAM BY PATHOLOGIST: CPT | Mod: TC,SUR,SAMLAB | Performed by: OTOLARYNGOLOGY

## 2024-02-21 PROCEDURE — 7100000009 HC PHASE TWO TIME - INITIAL BASE CHARGE: Performed by: OTOLARYNGOLOGY

## 2024-02-21 PROCEDURE — 2720000007 HC OR 272 NO HCPCS: Performed by: OTOLARYNGOLOGY

## 2024-02-21 PROCEDURE — 7100000002 HC RECOVERY ROOM TIME - EACH INCREMENTAL 1 MINUTE: Performed by: OTOLARYNGOLOGY

## 2024-02-21 PROCEDURE — 88304 TISSUE EXAM BY PATHOLOGIST: CPT | Performed by: PATHOLOGY

## 2024-02-21 PROCEDURE — 3700000002 HC GENERAL ANESTHESIA TIME - EACH INCREMENTAL 1 MINUTE: Performed by: OTOLARYNGOLOGY

## 2024-02-21 PROCEDURE — 2500000004 HC RX 250 GENERAL PHARMACY W/ HCPCS (ALT 636 FOR OP/ED): Performed by: OTOLARYNGOLOGY

## 2024-02-21 PROCEDURE — 2500000004 HC RX 250 GENERAL PHARMACY W/ HCPCS (ALT 636 FOR OP/ED): Performed by: ANESTHESIOLOGY

## 2024-02-21 PROCEDURE — 7100000001 HC RECOVERY ROOM TIME - INITIAL BASE CHARGE: Performed by: OTOLARYNGOLOGY

## 2024-02-21 PROCEDURE — 3600000008 HC OR TIME - EACH INCREMENTAL 1 MINUTE - PROCEDURE LEVEL THREE: Performed by: OTOLARYNGOLOGY

## 2024-02-21 PROCEDURE — 2500000005 HC RX 250 GENERAL PHARMACY W/O HCPCS: Performed by: ANESTHESIOLOGY

## 2024-02-21 RX ORDER — LABETALOL HYDROCHLORIDE 5 MG/ML
5 INJECTION, SOLUTION INTRAVENOUS ONCE AS NEEDED
Status: DISCONTINUED | OUTPATIENT
Start: 2024-02-21 | End: 2024-02-21 | Stop reason: HOSPADM

## 2024-02-21 RX ORDER — MORPHINE SULFATE 2 MG/ML
2 INJECTION, SOLUTION INTRAMUSCULAR; INTRAVENOUS EVERY 5 MIN PRN
Status: DISCONTINUED | OUTPATIENT
Start: 2024-02-21 | End: 2024-02-21 | Stop reason: HOSPADM

## 2024-02-21 RX ORDER — GLYCOPYRROLATE 0.2 MG/ML
INJECTION INTRAMUSCULAR; INTRAVENOUS AS NEEDED
Status: DISCONTINUED | OUTPATIENT
Start: 2024-02-21 | End: 2024-02-21

## 2024-02-21 RX ORDER — SODIUM CHLORIDE, SODIUM LACTATE, POTASSIUM CHLORIDE, CALCIUM CHLORIDE 600; 310; 30; 20 MG/100ML; MG/100ML; MG/100ML; MG/100ML
100 INJECTION, SOLUTION INTRAVENOUS CONTINUOUS
Status: DISCONTINUED | OUTPATIENT
Start: 2024-02-21 | End: 2024-02-21 | Stop reason: HOSPADM

## 2024-02-21 RX ORDER — MIDAZOLAM HYDROCHLORIDE 1 MG/ML
1 INJECTION INTRAMUSCULAR; INTRAVENOUS ONCE
Status: COMPLETED | OUTPATIENT
Start: 2024-02-21 | End: 2024-02-21

## 2024-02-21 RX ORDER — ACETAMINOPHEN 325 MG/1
975 TABLET ORAL ONCE
Status: COMPLETED | OUTPATIENT
Start: 2024-02-21 | End: 2024-02-21

## 2024-02-21 RX ORDER — ONDANSETRON HYDROCHLORIDE 2 MG/ML
4 INJECTION, SOLUTION INTRAVENOUS ONCE
Status: COMPLETED | OUTPATIENT
Start: 2024-02-21 | End: 2024-02-21

## 2024-02-21 RX ORDER — OMEPRAZOLE 20 MG/1
20 TABLET, DELAYED RELEASE ORAL
COMMUNITY
End: 2024-03-15 | Stop reason: ALTCHOICE

## 2024-02-21 RX ORDER — NEOSTIGMINE METHYLSULFATE 1 MG/ML
INJECTION, SOLUTION INTRAVENOUS AS NEEDED
Status: DISCONTINUED | OUTPATIENT
Start: 2024-02-21 | End: 2024-02-21

## 2024-02-21 RX ORDER — OXYCODONE HYDROCHLORIDE 5 MG/1
5 TABLET ORAL EVERY 4 HOURS PRN
Status: DISCONTINUED | OUTPATIENT
Start: 2024-02-21 | End: 2024-02-21 | Stop reason: HOSPADM

## 2024-02-21 RX ORDER — PROMETHAZINE HYDROCHLORIDE 25 MG/ML
INJECTION, SOLUTION INTRAMUSCULAR; INTRAVENOUS AS NEEDED
Status: DISCONTINUED | OUTPATIENT
Start: 2024-02-21 | End: 2024-02-21

## 2024-02-21 RX ORDER — ROCURONIUM BROMIDE 10 MG/ML
INJECTION, SOLUTION INTRAVENOUS AS NEEDED
Status: DISCONTINUED | OUTPATIENT
Start: 2024-02-21 | End: 2024-02-21

## 2024-02-21 RX ORDER — ONDANSETRON HYDROCHLORIDE 2 MG/ML
4 INJECTION, SOLUTION INTRAVENOUS ONCE AS NEEDED
Status: DISCONTINUED | OUTPATIENT
Start: 2024-02-21 | End: 2024-02-21 | Stop reason: HOSPADM

## 2024-02-21 RX ORDER — SODIUM CHLORIDE 9 MG/ML
INJECTION INTRAMUSCULAR; INTRAVENOUS; SUBCUTANEOUS AS NEEDED
Status: DISCONTINUED | OUTPATIENT
Start: 2024-02-21 | End: 2024-02-21 | Stop reason: HOSPADM

## 2024-02-21 RX ORDER — PROPOFOL 10 MG/ML
INJECTION, EMULSION INTRAVENOUS AS NEEDED
Status: DISCONTINUED | OUTPATIENT
Start: 2024-02-21 | End: 2024-02-21

## 2024-02-21 RX ORDER — BISMUTH SUBGALLATE
POWDER (GRAM) MISCELLANEOUS AS NEEDED
Status: DISCONTINUED | OUTPATIENT
Start: 2024-02-21 | End: 2024-02-21 | Stop reason: HOSPADM

## 2024-02-21 RX ORDER — PANTOPRAZOLE SODIUM 40 MG/1
40 TABLET, DELAYED RELEASE ORAL
Qty: 30 TABLET | Refills: 11 | Status: SHIPPED | OUTPATIENT
Start: 2024-02-21 | End: 2025-02-20

## 2024-02-21 RX ORDER — SODIUM CHLORIDE, SODIUM LACTATE, POTASSIUM CHLORIDE, CALCIUM CHLORIDE 600; 310; 30; 20 MG/100ML; MG/100ML; MG/100ML; MG/100ML
50 INJECTION, SOLUTION INTRAVENOUS CONTINUOUS
Status: DISCONTINUED | OUTPATIENT
Start: 2024-02-21 | End: 2024-02-21 | Stop reason: HOSPADM

## 2024-02-21 RX ORDER — DEXAMETHASONE SODIUM PHOSPHATE 10 MG/ML
8 INJECTION INTRAMUSCULAR; INTRAVENOUS ONCE
Status: COMPLETED | OUTPATIENT
Start: 2024-02-21 | End: 2024-02-21

## 2024-02-21 RX ORDER — FENTANYL CITRATE 50 UG/ML
INJECTION, SOLUTION INTRAMUSCULAR; INTRAVENOUS AS NEEDED
Status: DISCONTINUED | OUTPATIENT
Start: 2024-02-21 | End: 2024-02-21

## 2024-02-21 RX ORDER — OXYCODONE AND ACETAMINOPHEN 10; 325 MG/1; MG/1
1 TABLET ORAL EVERY 6 HOURS PRN
Qty: 20 TABLET | Refills: 0 | Status: SHIPPED | OUTPATIENT
Start: 2024-02-21

## 2024-02-21 RX ORDER — MORPHINE SULFATE 4 MG/ML
4 INJECTION, SOLUTION INTRAMUSCULAR; INTRAVENOUS EVERY 5 MIN PRN
Status: DISCONTINUED | OUTPATIENT
Start: 2024-02-21 | End: 2024-02-21 | Stop reason: HOSPADM

## 2024-02-21 RX ORDER — EPINEPHRINE 1 MG/ML
INJECTION, SOLUTION, CONCENTRATE INTRAVENOUS AS NEEDED
Status: DISCONTINUED | OUTPATIENT
Start: 2024-02-21 | End: 2024-02-21 | Stop reason: HOSPADM

## 2024-02-21 RX ORDER — AMOXICILLIN 500 MG/1
500 CAPSULE ORAL 2 TIMES DAILY
Qty: 20 CAPSULE | Refills: 0 | Status: SHIPPED | OUTPATIENT
Start: 2024-02-21 | End: 2024-03-02

## 2024-02-21 RX ADMIN — ROCURONIUM BROMIDE 30 MG: 10 INJECTION, SOLUTION INTRAVENOUS at 10:55

## 2024-02-21 RX ADMIN — FENTANYL CITRATE 100 MCG: 50 INJECTION, SOLUTION INTRAMUSCULAR; INTRAVENOUS at 10:55

## 2024-02-21 RX ADMIN — SODIUM CHLORIDE, POTASSIUM CHLORIDE, SODIUM LACTATE AND CALCIUM CHLORIDE 50 ML/HR: 600; 310; 30; 20 INJECTION, SOLUTION INTRAVENOUS at 10:33

## 2024-02-21 RX ADMIN — ACETAMINOPHEN 975 MG: 325 TABLET ORAL at 10:34

## 2024-02-21 RX ADMIN — IBUPROFEN 800 MG: 800 INJECTION INTRAVENOUS at 10:35

## 2024-02-21 RX ADMIN — PROPOFOL 200 MG: 10 INJECTION, EMULSION INTRAVENOUS at 10:55

## 2024-02-21 RX ADMIN — MIDAZOLAM HYDROCHLORIDE 1 MG: 1 INJECTION, SOLUTION INTRAMUSCULAR; INTRAVENOUS at 10:40

## 2024-02-21 RX ADMIN — ONDANSETRON 4 MG: 2 INJECTION INTRAMUSCULAR; INTRAVENOUS at 10:35

## 2024-02-21 RX ADMIN — DEXAMETHASONE SODIUM PHOSPHATE 8 MG: 10 INJECTION INTRAMUSCULAR; INTRAVENOUS at 10:35

## 2024-02-21 RX ADMIN — Medication 30 MG: at 10:55

## 2024-02-21 RX ADMIN — PROMETHAZINE HYDROCHLORIDE 6.25 MG: 25 INJECTION INTRAMUSCULAR; INTRAVENOUS at 11:01

## 2024-02-21 RX ADMIN — NEOSTIGMINE METHYLSULFATE 3 MG: 1 INJECTION, SOLUTION INTRAVENOUS at 11:15

## 2024-02-21 RX ADMIN — MORPHINE SULFATE 4 MG: 4 INJECTION, SOLUTION INTRAMUSCULAR; INTRAVENOUS at 11:46

## 2024-02-21 RX ADMIN — GLYCOPYRROLATE 0.6 MG: 0.2 INJECTION, SOLUTION INTRAMUSCULAR; INTRAVENOUS at 11:15

## 2024-02-21 ASSESSMENT — PAIN SCALES - GENERAL
PAINLEVEL_OUTOF10: 1
PAINLEVEL_OUTOF10: 3
PAINLEVEL_OUTOF10: 0 - NO PAIN
PAINLEVEL_OUTOF10: 2
PAINLEVEL_OUTOF10: 0 - NO PAIN
PAINLEVEL_OUTOF10: 0 - NO PAIN
PAINLEVEL_OUTOF10: 7
PAINLEVEL_OUTOF10: 0 - NO PAIN
PAINLEVEL_OUTOF10: 5 - MODERATE PAIN
PAINLEVEL_OUTOF10: 3

## 2024-02-21 ASSESSMENT — PAIN - FUNCTIONAL ASSESSMENT
PAIN_FUNCTIONAL_ASSESSMENT: 0-10

## 2024-02-21 ASSESSMENT — COLUMBIA-SUICIDE SEVERITY RATING SCALE - C-SSRS
1. IN THE PAST MONTH, HAVE YOU WISHED YOU WERE DEAD OR WISHED YOU COULD GO TO SLEEP AND NOT WAKE UP?: NO
6. HAVE YOU EVER DONE ANYTHING, STARTED TO DO ANYTHING, OR PREPARED TO DO ANYTHING TO END YOUR LIFE?: NO
2. HAVE YOU ACTUALLY HAD ANY THOUGHTS OF KILLING YOURSELF?: NO

## 2024-02-21 ASSESSMENT — PAIN DESCRIPTION - LOCATION: LOCATION: THROAT

## 2024-02-21 ASSESSMENT — ENCOUNTER SYMPTOMS: HEADACHES: 1

## 2024-02-21 NOTE — ANESTHESIA PREPROCEDURE EVALUATION
Patient: Mildred Spencer    Procedure Information       Date/Time: 02/21/24 1110    Procedure: Tonsillectomy and Adenoidectomy (Bilateral)    Location: [facility] OR 02 / [facility] OR    Surgeons: Rodolfo Zafar MD            Relevant Problems   GI   (+) Gastroesophageal reflux disease without esophagitis      Pulmonary   (+) Exercise-induced asthma      Other   (+) Tonsillitis and adenoiditis, chronic       Clinical information reviewed:   Tobacco  Allergies  Meds   Med Hx  Surg Hx   Fam Hx  Soc Hx        No data recorded     Physical Exam    Airway  Mallampati: II  TM distance: >3 FB  Neck ROM: full     Cardiovascular   Rhythm: regular  Rate: normal     Dental    Pulmonary    Abdominal        Anesthesia Plan    History of general anesthesia?: yes  History of complications of general anesthesia?: no    ASA 2     general     intravenous induction   Anesthetic plan and risks discussed with patient.     Anesthesia Evaluation      Airway   Mallampati: II  TM distance: >3 FB  Neck ROM: full  Dental      Pulmonary    (+) asthma  Cardiovascular - negative ROS  (+) past MI    Rhythm: regular  Rate: normal    Neuro/Psych    (+) headaches    GI/Hepatic/Renal    (+) GERD    Endo/Other - negative ROS   Abdominal

## 2024-02-21 NOTE — OP NOTE
Tonsillectomy and Adenoidectomy (B) Operative Note     Date: 2024  OR Location: [facility] OR    Name: Mildred Spencer, : 1990, Age: 33 y.o., MRN: [id number], Sex: female    Diagnosis  Pre-op Diagnosis     * Tonsillitis and adenoiditis, chronic [J35.03] Post-op Diagnosis     * Tonsillitis and adenoiditis, chronic [J35.03]     Procedures  Tonsillectomy and Adenoidectomy  23980 - DE TONSILLECTOMY & ADENOIDECTOMY AGE 12/>      Surgeons      * Rodolfo Zafar - Primary    Resident/Fellow/Other Assistant:  Surgeon(s) and Role:    Procedure Summary  Anesthesia: General  ASA: II  Anesthesia Staff: Anesthesiologist: Isaias Ching MD  Estimated Blood Loss: Minimal mL  Intra-op Medications: Administrations occurring from 1110 to 1210 on 24:  * No intraprocedure medications in log *           Anesthesia Record               Intraprocedure I/O Totals          Intake    Neostigmine 0.00 mL    The total shown is the total volume documented since Anesthesia Start was filed.    Total Intake 0 mL          Specimen:   ID Type Source Tests Collected by Time   1 : Right Tonsil Tissue TONSIL RESECTION RIGHT SURGICAL PATHOLOGY EXAM Rodolfo Zafar MD 2024 1022   2 : Left Tonsil Tissue TONSIL RESECTION LEFT SURGICAL PATHOLOGY EXAM Rodolfo Zafar MD 2024 1022        Staff:   Circulator: Khushi Ramsey RN  Scrub Person: Maribel Salamanca         Drains and/or Catheters: * None in log *    Tourniquet Times:         Implants:     Findings: Hypertrophy no sig adenoids    Indications: Mildred Spencer is an 33 y.o. female who is having surgery for Tonsillitis and adenoiditis, chronic [J35.03].     The patient was seen in the preoperative area. The risks, benefits, complications, treatment options, non-operative alternatives, expected recovery and outcomes were discussed with the patient. The possibilities of reaction to medication, pulmonary aspiration, injury to surrounding structures, bleeding, recurrent  infection, the need for additional procedures, failure to diagnose a condition, and creating a complication requiring transfusion or operation were discussed with the patient. The patient concurred with the proposed plan, giving informed consent.  The site of surgery was properly noted/marked if necessary per policy. The patient has been actively warmed in preoperative area. Preoperative antibiotics are not indicated. Venous thrombosis prophylaxis are not indicated.    Procedure Details: CLINICAL NOTE:  The patient is a 33-year-old female with a history of with a history of chronic adenotonsillitis and adenotonsillar hypertrophy.     OPERATIVE NOTE:   The patient was taken to the operating room and placed supine on the operating room table and after administration of general endotracheal anesthesia, the table was turned 90 degrees, head drape was applied and the McIvor mouth gag was inserted and suspended from the Nobles stand.  A red rubber catheter was placed through the nose and out the oral cavity to suspend a normal palate.  Indirect examination of the nasopharynx revealed no significant or recurrent adenoid tissue.    Attention was then directed to the tonsillectomy and the right tonsil was grasped at its superior pole and medialized.  A mucosal incision was made with the Coblation wand and the tonsillar capsule was identified.  The tonsil was removed along the capsular plane and Bismuth was smeared into the tonsillar fossa.  Attention was then directed to the opposite tonsil. The procedure was repeated in an identical fashion with identical clinical findings.  Hemostasis was found to have been achieved.  The nasopharynx and oral cavity were then irrigated with normal saline.    The patient was then reversed from anesthesia, extubated, and transferred to the recovery room (PACU) in satisfactory condition.  The patient tolerated the procedure well with minimal blood loss.    Complications:  None; patient  tolerated the procedure well.    Disposition: PACU - hemodynamically stable.  Condition: stable         Additional Details:     Attending Attestation:     Rodolfo Zafar  Phone Number: [phone]

## 2024-02-21 NOTE — TELEPHONE ENCOUNTER
Pt called stating that she sees about 20-30% improvement with omeprazole and still having significant symptoms.

## 2024-02-21 NOTE — ANESTHESIA POSTPROCEDURE EVALUATION
Patient: Mildred Spencer    Procedure Summary       Date: 02/21/24 Room / Location: [facility] OR 02 / [facility] OR    Anesthesia Start: 1048 Anesthesia Stop:     Procedure: Tonsillectomy and Adenoidectomy (Bilateral) Diagnosis:       Tonsillitis and adenoiditis, chronic      (Tonsillitis and adenoiditis, chronic [J35.03])    Surgeons: Rodolfo Zafar MD Responsible Provider: Isaias Ching MD    Anesthesia Type: general ASA Status: 2            Anesthesia Type: general    Vitals Value Taken Time   /77 02/21/24 1155   Temp 36.1 °C (97 °F) 02/21/24 1120   Pulse 74 02/21/24 1156   Resp 15 02/21/24 1156   SpO2 99 % 02/21/24 1156   Vitals shown include unvalidated device data.    Anesthesia Post Evaluation    Patient location during evaluation: bedside  Patient participation: complete - patient participated  Level of consciousness: sleepy but conscious  Pain management: adequate  Airway patency: patent  Cardiovascular status: acceptable  Respiratory status: acceptable  Hydration status: acceptable  Postoperative Nausea and Vomiting: none    No notable events documented.

## 2024-02-28 LAB
LABORATORY COMMENT REPORT: NORMAL
PATH REPORT.FINAL DX SPEC: NORMAL
PATH REPORT.GROSS SPEC: NORMAL
PATH REPORT.RELEVANT HX SPEC: NORMAL
PATH REPORT.TOTAL CANCER: NORMAL

## 2024-03-15 ENCOUNTER — OFFICE VISIT (OUTPATIENT)
Dept: PRIMARY CARE | Facility: CLINIC | Age: 34
End: 2024-03-15
Payer: COMMERCIAL

## 2024-03-15 VITALS
HEART RATE: 92 BPM | DIASTOLIC BLOOD PRESSURE: 62 MMHG | SYSTOLIC BLOOD PRESSURE: 118 MMHG | BODY MASS INDEX: 27.96 KG/M2 | HEIGHT: 63 IN | OXYGEN SATURATION: 98 % | WEIGHT: 157.8 LBS

## 2024-03-15 DIAGNOSIS — K21.9 GASTROESOPHAGEAL REFLUX DISEASE WITHOUT ESOPHAGITIS: Primary | ICD-10-CM

## 2024-03-15 DIAGNOSIS — M25.551 RIGHT HIP PAIN: ICD-10-CM

## 2024-03-15 DIAGNOSIS — R13.14 PHARYNGOESOPHAGEAL DYSPHAGIA: ICD-10-CM

## 2024-03-15 PROCEDURE — 99214 OFFICE O/P EST MOD 30 MIN: CPT | Performed by: FAMILY MEDICINE

## 2024-03-15 PROCEDURE — 1036F TOBACCO NON-USER: CPT | Performed by: FAMILY MEDICINE

## 2024-03-15 ASSESSMENT — ENCOUNTER SYMPTOMS
NUMBNESS: 0
DIARRHEA: 0
ABDOMINAL PAIN: 1
FATIGUE: 0
NAUSEA: 0
CONSTIPATION: 0
ARTHRALGIAS: 1
BLOOD IN STOOL: 0

## 2024-03-15 NOTE — PROGRESS NOTES
"Subjective   Patient ID: Mildred pSencer is a 33 y.o. female who presents for Follow-up.    HPI   Tonsillectomy was successful.  But it does make it a little difficult to interpret her GI symptoms.  The omeprazole did not do much she is definitely better with the pantoprazole, but the mild dysphagia stuff does not seem to be better but overall her reflux is better.  Continue pantoprazole  Refer GI Dr. Ochoa    She is having right hip issues and low back issues.  Sounds like some of her hip pain could be coming from her back, but she does have a click in her hip.  Okay to continue with chiropractor, but I recommend orthopedic referral Dr. Murillo to check on her hip.    Review of Systems   Constitutional:  Negative for fatigue.   Gastrointestinal:  Positive for abdominal pain. Negative for blood in stool, constipation, diarrhea and nausea.   Musculoskeletal:  Positive for arthralgias.   Skin:  Negative for rash.   Neurological:  Negative for numbness.       Objective   /62   Pulse 92   Ht 1.6 m (5' 3\")   Wt 71.6 kg (157 lb 12.8 oz)   LMP 02/01/2024 Comment: Denies pregnancy test. Signed waiver  SpO2 98%   BMI 27.95 kg/m²     Physical Exam  Constitutional:       Appearance: Normal appearance.   HENT:      Head: Normocephalic and atraumatic.   Skin:     General: Skin is warm and dry.   Neurological:      General: No focal deficit present.      Mental Status: She is alert and oriented to person, place, and time.   Psychiatric:         Mood and Affect: Mood normal.         Behavior: Behavior normal.         Thought Content: Thought content normal.         Judgment: Judgment normal.         Assessment/Plan   Problem List Items Addressed This Visit             ICD-10-CM    Gastroesophageal reflux disease without esophagitis - Primary K21.9    Relevant Orders    Referral to Gastroenterology    Pharyngoesophageal dysphagia R13.14    Relevant Orders    Referral to Gastroenterology     Other Visit Diagnoses  "        Codes    Right hip pain     M25.551    Relevant Orders    Referral to Orthopaedic Surgery

## 2024-03-18 ENCOUNTER — PROCEDURE VISIT (OUTPATIENT)
Dept: NEUROLOGY | Facility: CLINIC | Age: 34
End: 2024-03-18
Payer: COMMERCIAL

## 2024-03-18 DIAGNOSIS — G43.709 CHRONIC MIGRAINE W/O AURA W/O STATUS MIGRAINOSUS, NOT INTRACTABLE: ICD-10-CM

## 2024-03-18 PROCEDURE — 64615 CHEMODENERV MUSC MIGRAINE: CPT | Performed by: STUDENT IN AN ORGANIZED HEALTH CARE EDUCATION/TRAINING PROGRAM

## 2024-03-18 RX ORDER — OXYCODONE HYDROCHLORIDE 5 MG/1
5 TABLET ORAL EVERY 4 HOURS PRN
COMMUNITY
Start: 2024-02-21

## 2024-03-18 RX ORDER — DROSPIRENONE AND ETHINYL ESTRADIOL 0.02-3(28)
KIT ORAL
COMMUNITY
Start: 2020-11-24

## 2024-03-18 RX ORDER — NORETHINDRONE ACETATE AND ETHINYL ESTRADIOL .02; 1 MG/1; MG/1
TABLET ORAL
COMMUNITY
Start: 2021-10-19

## 2024-03-18 RX ORDER — DEXAMETHASONE 4 MG/1
TABLET ORAL
COMMUNITY
Start: 2021-03-05

## 2024-03-18 RX ORDER — NORETHINDRONE ACETATE AND ETHINYL ESTRADIOL 1MG-20(21)
KIT ORAL
COMMUNITY
Start: 2021-03-26

## 2024-03-18 RX ORDER — ONDANSETRON 8 MG/1
TABLET, ORALLY DISINTEGRATING ORAL
COMMUNITY
Start: 2024-02-23

## 2024-03-18 RX ORDER — TOPIRAMATE 50 MG/1
50 TABLET, FILM COATED ORAL NIGHTLY
Qty: 90 TABLET | Refills: 3 | Status: SHIPPED | OUTPATIENT
Start: 2024-03-18 | End: 2024-05-09 | Stop reason: SDUPTHER

## 2024-03-18 RX ORDER — OMEPRAZOLE 40 MG/1
CAPSULE, DELAYED RELEASE ORAL
COMMUNITY
Start: 2024-01-25

## 2024-03-18 RX ORDER — CITALOPRAM 10 MG/1
TABLET ORAL
COMMUNITY
Start: 2022-04-05

## 2024-03-18 NOTE — PROGRESS NOTES
Neurology Botulinum Injection    Subjective   Mildred Spencer is an 33 y.o. female here for medical botulinum injection for No primary diagnosis found..     Since last Botox, headaches down to 1/30 HA days per month since starting low dose topiramate. Denies any side effects to any medications. Currently on birth control.     Head/Face/Jaw Botulinum Injection    Date/Time: 3/18/2024 9:50 AM    Performed by: Chino Garcia DO  Authorized by: Chino Garcia DO      Procedure details:        Total units injected:  0     Total units wasted:  0    Comments: Procedure Note: PREEMPT Botox    Indications: Chronic Migraine    Informed consent was obtained (explaining the procedure and risks and benefits of procedure) from patient: the signed consent form was placed in the medical record.  A time out was completed, verifying correct patient, procedure,site, positioning, and implants or special equipment.    200 units of OnabotulinumtoxinA were reconstituted with saline. Sites of injection were identified via PREEMPT protocol and cleaned with alcohol pads. Using a 30 gauge needle, patient received following injections:    5 units in procerus  5 units in each left and right   10 units divided between 2 sites of L frontalis  10 units divided between 2 sites of R frontalis  20 units divided between 4 sites of L temporalis  20 units divided between 4 sites of R temporalis  15 units divided between 3 sites of L occipitalis  15 units divided between 3 sites of R occipitalis  10 units divided between 2 sites of L paracervical muscles  10 units divided between 2 sites of R paracervical muscles  15 units divided between 3 sites of L trapezius  15 units divided between 3 sites of R trapezius    Additional Sites:  10u L temporalis  10u R temporalis  10u L occipitalis  10u R occipitalis    Used: 195u  Wasted: 5u    There were no complications. Patient was comfortable and left without complaint.     OnabotulinumtoxinA  Lot #:  [id number]  Exp: 4/2026

## 2024-04-17 ENCOUNTER — TELEPHONE (OUTPATIENT)
Dept: PRIMARY CARE | Facility: CLINIC | Age: 34
End: 2024-04-17
Payer: COMMERCIAL

## 2024-04-17 NOTE — TELEPHONE ENCOUNTER
PT CALLED STATING THAT SHE HAS SWALLOW TEST 4/19 SCOPE WITH MALHOLTRA 5/10. SHE STATES HER ACID REFLUX IS GETTING WORST SHE HAS ACID TASTE IN HER MOUTH AND CAN'T TASTE FOOD. HER TONGUE ALSO BURNS. SHE ALSO HAS WEIRD HICCUPS AND A COUGH.

## 2024-04-18 ENCOUNTER — SPECIALTY PHARMACY (OUTPATIENT)
Dept: PHARMACY | Facility: CLINIC | Age: 34
End: 2024-04-18

## 2024-04-18 PROCEDURE — RXMED WILLOW AMBULATORY MEDICATION CHARGE

## 2024-04-19 ENCOUNTER — HOSPITAL ENCOUNTER (OUTPATIENT)
Dept: RADIOLOGY | Facility: HOSPITAL | Age: 34
Discharge: HOME | End: 2024-04-19
Payer: COMMERCIAL

## 2024-04-19 DIAGNOSIS — R13.14 PHARYNGOESOPHAGEAL DYSPHAGIA: Primary | ICD-10-CM

## 2024-04-19 DIAGNOSIS — R13.10 DYSPHAGIA: ICD-10-CM

## 2024-04-19 PROCEDURE — 74230 X-RAY XM SWLNG FUNCJ C+: CPT | Performed by: RADIOLOGY

## 2024-04-19 PROCEDURE — 2500000001 HC RX 250 WO HCPCS SELF ADMINISTERED DRUGS (ALT 637 FOR MEDICARE OP): Performed by: OTOLARYNGOLOGY

## 2024-04-19 PROCEDURE — 74230 X-RAY XM SWLNG FUNCJ C+: CPT

## 2024-04-19 PROCEDURE — 3430000001 HC RX 343 DIAGNOSTIC RADIOPHARMACEUTICALS: Performed by: OTOLARYNGOLOGY

## 2024-04-19 PROCEDURE — 92611 MOTION FLUOROSCOPY/SWALLOW: CPT | Mod: GN | Performed by: SPEECH-LANGUAGE PATHOLOGIST

## 2024-04-19 RX ADMIN — BARIUM SULFATE 110 ML: 0.81 POWDER, FOR SUSPENSION ORAL at 09:45

## 2024-04-19 RX ADMIN — BARIUM SULFATE 10 ML: 400 SUSPENSION ORAL at 09:44

## 2024-04-19 RX ADMIN — BARIUM SULFATE 20 ML: 400 PASTE ORAL at 09:45

## 2024-04-19 RX ADMIN — BARIUM SULFATE 90 ML: 400 SUSPENSION ORAL at 09:44

## 2024-04-19 NOTE — ADDENDUM NOTE
Encounter addended by: Kathe Scott, GINNA-SLP on: 4/19/2024 12:26 PM   Actions taken: Flowsheet accepted, Clinical Note Signed

## 2024-04-19 NOTE — PROCEDURES
"Speech-Language Pathology      Modified Barium Swallow Study     Patient Name: Mildred Spencer  MRN: [id number]  : 1990  Today's Date: 24     Time Calculation  Start Time: 920  Stop Time: 945  Time Calculation (min): 25 min        Recommendations:  Diet: Regular texture diet and thin liquids  Compensatory Strategies: Seated upright during intake and for at least 30 minutes after, small bites/sips, alternate liquids and solids, moisten dry foods, slow rate with rest breaks  Follow up: GI      Assessment/Impression:    Full detailed SLP/Radiologist Modified Barium Swallow study report can be found under Chart Review tab, Imaging tab and  titled \"FL Modified Barium Swallow Study\"      Pt. Presenting with mild pharyngoesophageal dysphagia marked by trace esophageal retention with retrograde flow above level of UES, flash penetration with thin liquids, and decreased tongue base retraction.     Plan:  SLP Plan: No treatment needs identified at this time     Discussed POC: Patient  Discussed Risks/Benefits: Yes  Patient/Caregiver Agreeable: Yes    Pain:   Rating 0-10: 3  Location: Throat       SLP Outcome Measures:  EAT 10  My swallowing problem has caused me to lose weight.: 4  My swallowing problem interferes with my ability to go out for meals.: 4  Swallowing liquids takes extra effort.: 2  Swallowing solids takes extra effort.: 2  Swallowing pills takes extra effort.: 0  Swallowing is painful: 4  The pleasure of eating is affected by my swallowing.: 4  When I swallow food sticks in my throat.: 3  I cough when I eat.: 2  Swallowing is stressful: 4  EAT-10 TOTAL SCORE:: 29     Education:   Pt. Educated on results of MBS study, recommended diet and recommended safe swallow strategies.  Verbal understanding given   "

## 2024-04-23 ENCOUNTER — PHARMACY VISIT (OUTPATIENT)
Dept: PHARMACY | Facility: CLINIC | Age: 34
End: 2024-04-23
Payer: COMMERCIAL

## 2024-05-09 DIAGNOSIS — G43.709 CHRONIC MIGRAINE W/O AURA W/O STATUS MIGRAINOSUS, NOT INTRACTABLE: ICD-10-CM

## 2024-05-09 RX ORDER — TOPIRAMATE 50 MG/1
50 TABLET, FILM COATED ORAL NIGHTLY
Qty: 90 TABLET | Refills: 3 | Status: SHIPPED | OUTPATIENT
Start: 2024-05-09

## 2024-06-17 ENCOUNTER — APPOINTMENT (OUTPATIENT)
Dept: NEUROLOGY | Facility: CLINIC | Age: 34
End: 2024-06-17
Payer: COMMERCIAL

## 2024-06-17 DIAGNOSIS — G43.709 CHRONIC MIGRAINE WITHOUT AURA WITHOUT STATUS MIGRAINOSUS, NOT INTRACTABLE: Primary | ICD-10-CM

## 2024-06-17 PROCEDURE — 64615 CHEMODENERV MUSC MIGRAINE: CPT | Performed by: STUDENT IN AN ORGANIZED HEALTH CARE EDUCATION/TRAINING PROGRAM

## 2024-06-17 RX ORDER — AMOXICILLIN 875 MG/1
1 TABLET, FILM COATED ORAL
COMMUNITY
Start: 2023-09-04

## 2024-06-17 NOTE — PROGRESS NOTES
Neurology Botulinum Injection    Subjective   Mildred Spencer is an 34 y.o. female here for medical botulinum injection for Chronic migraine without aura without status migrainosus, not intractable [G43.709].     Headaches have resolved since last Botox. Has been having some difficulty swallowing since February after tonsillectomy. Would like to continue Botox today, but per our discussion, will go back down to standard protocol.     Botox    Date/Time: 6/17/2024 8:36 AM    Performed by: Chino Garcia DO  Authorized by: Chino Garcia DO    Procedure specific details:      Procedure Note: PREEMPT Botox    Indications: Chronic Migraine    Informed consent was obtained (explaining the procedure and risks and benefits of procedure) from patient: the signed consent form was placed in the medical record.  A time out was completed, verifying correct patient, procedure,site, positioning, and implants or special equipment.    200 units of OnabotulinumtoxinA were reconstituted with saline. Sites of injection were identified via PREEMPT protocol and cleaned with alcohol pads. Using a 30 gauge needle, patient received following injections:    5 units in procerus  5 units in each left and right   10 units divided between 2 sites of L frontalis  10 units divided between 2 sites of R frontalis  20 units divided between 4 sites of L temporalis  20 units divided between 4 sites of R temporalis  15 units divided between 3 sites of L occipitalis  15 units divided between 3 sites of R occipitalis  10 units divided between 2 sites of L paracervical muscles  10 units divided between 2 sites of R paracervical muscles  15 units divided between 3 sites of L trapezius  15 units divided between 3 sites of R trapezius    Additional Sites:  none    Used: 155u  Wasted: 45u    There were no complications. Patient was comfortable and left without complaint.     OnabotulinumtoxinA  Lot #: [id number]  Exp: 8/2026

## 2024-06-26 ENCOUNTER — APPOINTMENT (OUTPATIENT)
Dept: PRIMARY CARE | Facility: CLINIC | Age: 34
End: 2024-06-26
Payer: COMMERCIAL

## 2024-07-19 ENCOUNTER — SPECIALTY PHARMACY (OUTPATIENT)
Dept: PHARMACY | Facility: CLINIC | Age: 34
End: 2024-07-19

## 2024-08-02 ENCOUNTER — OFFICE VISIT (OUTPATIENT)
Age: 34
End: 2024-08-02
Payer: COMMERCIAL

## 2024-08-02 VITALS
HEIGHT: 63 IN | HEART RATE: 97 BPM | OXYGEN SATURATION: 97 % | SYSTOLIC BLOOD PRESSURE: 108 MMHG | BODY MASS INDEX: 25.91 KG/M2 | DIASTOLIC BLOOD PRESSURE: 64 MMHG | WEIGHT: 146.2 LBS

## 2024-08-02 DIAGNOSIS — K21.9 GASTROESOPHAGEAL REFLUX DISEASE WITHOUT ESOPHAGITIS: ICD-10-CM

## 2024-08-02 DIAGNOSIS — R13.14 PHARYNGOESOPHAGEAL DYSPHAGIA: Primary | ICD-10-CM

## 2024-08-02 RX ORDER — LANOLIN ALCOHOL/MO/W.PET/CERES
1000 CREAM (GRAM) TOPICAL DAILY
COMMUNITY

## 2024-08-02 RX ORDER — BACLOFEN 20 MG
TABLET ORAL
COMMUNITY

## 2024-08-02 RX ORDER — ESOMEPRAZOLE MAGNESIUM 40 MG/1
40 CAPSULE, DELAYED RELEASE ORAL 2 TIMES DAILY
Qty: 60 CAPSULE | Refills: 5 | Status: SHIPPED | OUTPATIENT
Start: 2024-08-02 | End: 2025-01-29

## 2024-08-02 RX ORDER — CETIRIZINE HYDROCHLORIDE 10 MG/1
10 TABLET ORAL DAILY
COMMUNITY

## 2024-08-02 ASSESSMENT — ENCOUNTER SYMPTOMS
FATIGUE: 0
SLEEP DISTURBANCE: 0
CONSTIPATION: 0
ABDOMINAL PAIN: 1
BLOOD IN STOOL: 0
COUGH: 1
SHORTNESS OF BREATH: 0
VOMITING: 0
NAUSEA: 0
DIARRHEA: 0

## 2024-08-02 ASSESSMENT — PATIENT HEALTH QUESTIONNAIRE - PHQ9
SUM OF ALL RESPONSES TO PHQ9 QUESTIONS 1 AND 2: 0
2. FEELING DOWN, DEPRESSED OR HOPELESS: NOT AT ALL
1. LITTLE INTEREST OR PLEASURE IN DOING THINGS: NOT AT ALL

## 2024-08-02 NOTE — PROGRESS NOTES
"Subjective   Patient ID: Mildred Spencer is a 34 y.o. female who presents for GERD (Burning in throat, lack of taste ) and Abdominal Cramping (Cramping x 4 days, post menstral cycle ).    HPI   Interesting problem.  She is definitely having signs of reflux but when they did the EGD there was no hiatal hernia or stenosis no signs of esophagitis no signs of Hutson's, and the biopsy was negative.  But she is definitely getting acid reflux coming up and she is having dysphagia.  Fortunately no coffee-ground emesis or vomiting, no black or tarry stools or blood in the stool persistent nausea diarrhea constipation  At this point have to wonder about swallowing mechanism.  Will refer to Dr. Humza song in [place] who specializes in reflux.  Stop pantoprazole using it twice a day did not help.  She tried omeprazole in the past did not help  Prescription done for 40 mg of Nexium twice a day.  If we have troubles with that I told her how to use the over-the-counter version and Prevacid for taking 2 pills twice a day.    Also she is having a taste issue that is something new.  She never had this before with her reflux.  She just recently had her tonsils out.  If that persist I think I would get her back to see Dr. Zafar ENT for his opinion.    Review of Systems   Constitutional:  Negative for fatigue.   Respiratory:  Positive for cough. Negative for shortness of breath.    Cardiovascular:  Negative for chest pain.   Gastrointestinal:  Positive for abdominal pain. Negative for blood in stool, constipation, diarrhea, nausea and vomiting.   Psychiatric/Behavioral:  Negative for sleep disturbance.        Objective   /64   Pulse 97   Ht 1.6 m (5' 3\")   Wt 66.3 kg (146 lb 3.2 oz)   SpO2 97%   BMI 25.90 kg/m²     Physical Exam  Constitutional:       Appearance: Normal appearance.   HENT:      Head: Normocephalic and atraumatic.   Skin:     General: Skin is warm and dry.   Neurological:      General: No focal " deficit present.      Mental Status: She is alert and oriented to person, place, and time.   Psychiatric:         Mood and Affect: Mood normal.         Behavior: Behavior normal.         Thought Content: Thought content normal.         Judgment: Judgment normal.         Assessment/Plan   Problem List Items Addressed This Visit             ICD-10-CM    Gastroesophageal reflux disease without esophagitis K21.9    Relevant Medications    esomeprazole (NexIUM) 40 mg DR capsule    Other Relevant Orders    Referral to General Surgery    Pharyngoesophageal dysphagia - Primary R13.14    Relevant Orders    Referral to General Surgery

## 2024-08-02 NOTE — PATIENT INSTRUCTIONS
Generic Nexium over-the-counter 20 mg - 2 pills twice a day  Generic Prevacid 15 mg - 2 pills twice a day

## 2024-09-30 ENCOUNTER — APPOINTMENT (OUTPATIENT)
Dept: NEUROLOGY | Facility: CLINIC | Age: 34
End: 2024-09-30
Payer: COMMERCIAL

## 2024-09-30 DIAGNOSIS — G43.709 CHRONIC MIGRAINE WITHOUT AURA WITHOUT STATUS MIGRAINOSUS, NOT INTRACTABLE: Primary | ICD-10-CM

## 2024-09-30 PROCEDURE — 64615 CHEMODENERV MUSC MIGRAINE: CPT | Performed by: STUDENT IN AN ORGANIZED HEALTH CARE EDUCATION/TRAINING PROGRAM

## 2024-09-30 PROCEDURE — 96372 THER/PROPH/DIAG INJ SC/IM: CPT | Performed by: STUDENT IN AN ORGANIZED HEALTH CARE EDUCATION/TRAINING PROGRAM

## 2024-09-30 PROCEDURE — 2500000004 HC RX 250 GENERAL PHARMACY W/ HCPCS (ALT 636 FOR OP/ED): Mod: JZ | Performed by: STUDENT IN AN ORGANIZED HEALTH CARE EDUCATION/TRAINING PROGRAM

## 2024-09-30 NOTE — PROGRESS NOTES
Neurology Botulinum Injection    Subjective   Mildred Spencer is an 34 y.o. female here for medical botulinum injection for Chronic migraine without aura without status migrainosus, not intractable [G43.709].     Since last Botox, has had an increase in headaches to 15/30 HA days per month. Denies any side effects to Botox. Would like to go back up to higher dose.     Botox    Date/Time: 9/30/2024 9:21 AM    Performed by: Chino Garcia DO  Authorized by: Chino Garcia DO    Procedure specific details:      Procedure Note: PREEMPT Botox    Indications: Chronic Migraine    Informed consent was obtained (explaining the procedure and risks and benefits of procedure) from patient: the signed consent form was placed in the medical record.  A time out was completed, verifying correct patient, procedure,site, positioning, and implants or special equipment.    200 units of OnabotulinumtoxinA were reconstituted with saline. Sites of injection were identified via PREEMPT protocol and cleaned with alcohol pads. Using a 30 gauge needle, patient received following injections:    5 units in procerus  5 units in each left and right   10 units divided between 2 sites of L frontalis  10 units divided between 2 sites of R frontalis  20 units divided between 4 sites of L temporalis  20 units divided between 4 sites of R temporalis  15 units divided between 3 sites of L occipitalis  15 units divided between 3 sites of R occipitalis  10 units divided between 2 sites of L paracervical muscles  10 units divided between 2 sites of R paracervical muscles  15 units divided between 3 sites of L trapezius  15 units divided between 3 sites of R trapezius    Additional Sites:  10u L temporalis  10u R temporalis  10u L occipitalis  10u R occipitalis    Used: 195u  Wasted: 5u    There were no complications. Patient was comfortable and left without complaint.     OnabotulinumtoxinA  Lot #: [id number]  Exp: 11/2026

## 2024-11-06 ENCOUNTER — APPOINTMENT (OUTPATIENT)
Age: 34
End: 2024-11-06
Payer: COMMERCIAL

## 2024-11-06 ENCOUNTER — LAB (OUTPATIENT)
Facility: LAB | Age: 34
End: 2024-11-06
Payer: COMMERCIAL

## 2024-11-06 VITALS
HEIGHT: 63 IN | WEIGHT: 141 LBS | BODY MASS INDEX: 24.98 KG/M2 | HEART RATE: 76 BPM | DIASTOLIC BLOOD PRESSURE: 70 MMHG | SYSTOLIC BLOOD PRESSURE: 108 MMHG

## 2024-11-06 DIAGNOSIS — Z00.00 ROUTINE GENERAL MEDICAL EXAMINATION AT A HEALTH CARE FACILITY: ICD-10-CM

## 2024-11-06 DIAGNOSIS — Z00.00 ROUTINE GENERAL MEDICAL EXAMINATION AT A HEALTH CARE FACILITY: Primary | ICD-10-CM

## 2024-11-06 PROBLEM — K22.719 BARRETT'S ESOPHAGUS WITH DYSPLASIA: Status: ACTIVE | Noted: 2024-11-06

## 2024-11-06 LAB
ALBUMIN SERPL BCP-MCNC: 4.5 G/DL (ref 3.4–5)
ALP SERPL-CCNC: 40 U/L (ref 33–110)
ALT SERPL W P-5'-P-CCNC: 7 U/L (ref 7–45)
ANION GAP SERPL CALC-SCNC: 10 MMOL/L (ref 10–20)
AST SERPL W P-5'-P-CCNC: 11 U/L (ref 9–39)
BILIRUB SERPL-MCNC: 0.4 MG/DL (ref 0–1.2)
BUN SERPL-MCNC: 10 MG/DL (ref 6–23)
CALCIUM SERPL-MCNC: 9.3 MG/DL (ref 8.6–10.3)
CHLORIDE SERPL-SCNC: 107 MMOL/L (ref 98–107)
CHOLEST SERPL-MCNC: 187 MG/DL (ref 0–199)
CHOLESTEROL/HDL RATIO: 4.5
CO2 SERPL-SCNC: 26 MMOL/L (ref 21–32)
CREAT SERPL-MCNC: 1.02 MG/DL (ref 0.5–1.05)
EGFRCR SERPLBLD CKD-EPI 2021: 74 ML/MIN/1.73M*2
ERYTHROCYTE [DISTWIDTH] IN BLOOD BY AUTOMATED COUNT: 13.7 % (ref 11.5–14.5)
GLUCOSE SERPL-MCNC: 87 MG/DL (ref 74–99)
HCT VFR BLD AUTO: 42.3 % (ref 36–46)
HDLC SERPL-MCNC: 42 MG/DL
HGB BLD-MCNC: 13.3 G/DL (ref 12–16)
LDLC SERPL CALC-MCNC: 128 MG/DL
MCH RBC QN AUTO: 26.2 PG (ref 26–34)
MCHC RBC AUTO-ENTMCNC: 31.4 G/DL (ref 32–36)
MCV RBC AUTO: 83 FL (ref 80–100)
NON HDL CHOLESTEROL: 145 MG/DL (ref 0–149)
NRBC BLD-RTO: 0 /100 WBCS (ref 0–0)
PLATELET # BLD AUTO: 316 X10*3/UL (ref 150–450)
POTASSIUM SERPL-SCNC: 4.1 MMOL/L (ref 3.5–5.3)
PROT SERPL-MCNC: 7 G/DL (ref 6.4–8.2)
RBC # BLD AUTO: 5.07 X10*6/UL (ref 4–5.2)
SODIUM SERPL-SCNC: 139 MMOL/L (ref 136–145)
TRIGL SERPL-MCNC: 83 MG/DL (ref 0–149)
TSH SERPL-ACNC: 1.74 MIU/L (ref 0.44–3.98)
VLDL: 17 MG/DL (ref 0–40)
WBC # BLD AUTO: 5.2 X10*3/UL (ref 4.4–11.3)

## 2024-11-06 PROCEDURE — 84443 ASSAY THYROID STIM HORMONE: CPT

## 2024-11-06 PROCEDURE — 1036F TOBACCO NON-USER: CPT | Performed by: FAMILY MEDICINE

## 2024-11-06 PROCEDURE — 36415 COLL VENOUS BLD VENIPUNCTURE: CPT

## 2024-11-06 PROCEDURE — 99395 PREV VISIT EST AGE 18-39: CPT | Performed by: FAMILY MEDICINE

## 2024-11-06 PROCEDURE — 80061 LIPID PANEL: CPT

## 2024-11-06 PROCEDURE — 3008F BODY MASS INDEX DOCD: CPT | Performed by: FAMILY MEDICINE

## 2024-11-06 PROCEDURE — 80053 COMPREHEN METABOLIC PANEL: CPT

## 2024-11-06 PROCEDURE — 85027 COMPLETE CBC AUTOMATED: CPT

## 2024-11-06 RX ORDER — CITALOPRAM 10 MG/1
10 TABLET ORAL DAILY
Qty: 30 TABLET | Refills: 11 | Status: SHIPPED | OUTPATIENT
Start: 2024-11-06 | End: 2025-11-06

## 2024-11-06 RX ORDER — OMEPRAZOLE 40 MG/1
40 CAPSULE, DELAYED RELEASE ORAL
Qty: 60 CAPSULE | Refills: 11 | Status: SHIPPED | OUTPATIENT
Start: 2024-11-06 | End: 2025-11-06

## 2024-11-06 ASSESSMENT — ENCOUNTER SYMPTOMS
ABDOMINAL PAIN: 0
CHEST TIGHTNESS: 0
HEADACHES: 0
NERVOUS/ANXIOUS: 1
PALPITATIONS: 0
COUGH: 0
NAUSEA: 1
FATIGUE: 0
DIARRHEA: 0
CONSTIPATION: 0
SHORTNESS OF BREATH: 0

## 2024-11-06 ASSESSMENT — PATIENT HEALTH QUESTIONNAIRE - PHQ9
2. FEELING DOWN, DEPRESSED OR HOPELESS: NOT AT ALL
SUM OF ALL RESPONSES TO PHQ9 QUESTIONS 1 & 2: 0
1. LITTLE INTEREST OR PLEASURE IN DOING THINGS: NOT AT ALL

## 2024-11-06 NOTE — PROGRESS NOTES
"Subjective   Patient ID: Mildred Spencer is a 34 y.o. female who presents for Annual Exam.    HPI   Did see Dr. Ching's group showed [facility].  So she is going to get a sliding hiatal hernia repair with the Linx procedure done that will be in December.  Having some anxiety for now we will restart the citalopram 10 mg a day.  Right now she does have the omeprazole for 40 mg twice a day.  She might be able to cut back on that.  She will follow with Dr. Ching wants her to do  Migraines to have a stable  She is doing okay with her exercise-induced asthma  She is lost about 25 pounds both purposefully and because of the troubles with swallowing and her hernia.    Will check some basic labs today for wellness    Review of Systems   Constitutional:  Negative for fatigue.   Eyes:  Negative for visual disturbance.   Respiratory:  Negative for cough, chest tightness and shortness of breath.    Cardiovascular:  Negative for chest pain and palpitations.   Gastrointestinal:  Positive for nausea. Negative for abdominal pain, constipation and diarrhea.   Skin:  Negative for rash.   Neurological:  Negative for headaches.   Psychiatric/Behavioral:  The patient is nervous/anxious.        Objective   /70   Pulse 76   Ht 1.6 m (5' 3\")   Wt 64 kg (141 lb)   BMI 24.98 kg/m²     Physical Exam  Constitutional:       Appearance: Normal appearance.   HENT:      Head: Normocephalic and atraumatic.   Cardiovascular:      Rate and Rhythm: Normal rate and regular rhythm.      Heart sounds: Normal heart sounds.   Pulmonary:      Effort: Pulmonary effort is normal.      Breath sounds: Normal breath sounds.   Skin:     General: Skin is warm and dry.   Neurological:      General: No focal deficit present.      Mental Status: She is alert and oriented to person, place, and time.   Psychiatric:         Mood and Affect: Mood normal.         Behavior: Behavior normal.         Thought Content: Thought content normal.         Judgment: " Judgment normal.         Assessment/Plan   Problem List Items Addressed This Visit    None  Visit Diagnoses         Codes    Routine general medical examination at a health care facility    -  Primary Z00.00    Relevant Medications    omeprazole (PriLOSEC) 40 mg DR capsule    citalopram (CeleXA) 10 mg tablet    Other Relevant Orders    CBC    Comprehensive Metabolic Panel    Thyroid Stimulating Hormone    Lipid Panel

## 2024-11-07 ENCOUNTER — TELEPHONE (OUTPATIENT)
Dept: PRIMARY CARE | Facility: CLINIC | Age: 34
End: 2024-11-07
Payer: COMMERCIAL

## 2024-11-07 NOTE — TELEPHONE ENCOUNTER
This patient called to schedule with you . She saw you in 2023 and was following up to let you know she is having surgery at the beginning of December for REPAIR HERNIA HIATAL WITH SPHINCTER AUGMENTATION . I was able to schedule her for Jan 24 , 2025 . I didn't know if you needed to touch base with her sooner before her surgery.    Patient can be reached at [phone]    Please advise     Thank you !

## 2024-11-08 DIAGNOSIS — G43.709 CHRONIC MIGRAINE W/O AURA W/O STATUS MIGRAINOSUS, NOT INTRACTABLE: ICD-10-CM

## 2024-11-08 RX ORDER — TOPIRAMATE 100 MG/1
100 TABLET, FILM COATED ORAL NIGHTLY
Qty: 90 TABLET | Refills: 3 | Status: SHIPPED | OUTPATIENT
Start: 2024-11-08 | End: 2025-11-08

## 2024-11-19 DIAGNOSIS — G43.709 CHRONIC MIGRAINE W/O AURA W/O STATUS MIGRAINOSUS, NOT INTRACTABLE: ICD-10-CM

## 2024-11-19 RX ORDER — TOPIRAMATE 50 MG/1
50 TABLET, FILM COATED ORAL NIGHTLY
Qty: 90 TABLET | Refills: 3 | Status: SHIPPED | OUTPATIENT
Start: 2024-11-19 | End: 2025-11-19

## 2024-11-22 ENCOUNTER — APPOINTMENT (OUTPATIENT)
Dept: PRIMARY CARE | Facility: CLINIC | Age: 34
End: 2024-11-22
Payer: COMMERCIAL

## 2024-11-22 DIAGNOSIS — R79.0 LOW MAGNESIUM LEVEL: ICD-10-CM

## 2024-11-22 DIAGNOSIS — Z71.3 DIETARY COUNSELING: Primary | ICD-10-CM

## 2024-11-22 DIAGNOSIS — R79.89 LOW VITAMIN B12 LEVEL: Primary | ICD-10-CM

## 2024-11-22 PROCEDURE — 97803 MED NUTRITION INDIV SUBSEQ: CPT | Performed by: DIETITIAN, REGISTERED

## 2024-11-25 ENCOUNTER — LAB (OUTPATIENT)
Dept: LAB | Facility: LAB | Age: 34
End: 2024-11-25
Payer: COMMERCIAL

## 2024-11-25 DIAGNOSIS — R79.0 LOW MAGNESIUM LEVEL: ICD-10-CM

## 2024-11-25 DIAGNOSIS — R79.89 LOW VITAMIN B12 LEVEL: ICD-10-CM

## 2024-11-25 LAB
MAGNESIUM SERPL-MCNC: 1.96 MG/DL (ref 1.6–2.4)
VIT B12 SERPL-MCNC: 1161 PG/ML (ref 211–911)

## 2024-11-25 PROCEDURE — 36415 COLL VENOUS BLD VENIPUNCTURE: CPT

## 2024-11-25 PROCEDURE — 82607 VITAMIN B-12: CPT

## 2024-11-25 PROCEDURE — 83735 ASSAY OF MAGNESIUM: CPT

## 2024-11-25 NOTE — PROGRESS NOTES
"Nutrition Assessment     Reason for Visit:  Mildred Spencer is a 34 y.o. female who presents for Nutrition Counseling (ED hx, planning for Linx procedure)    Anthropometrics:            Food And Nutrient Intake:  Food and Nutrient History  Food and Nutrient History: Pt is having a linx procedure on 12/9. She has struggled with PO intake over the last year since having tonsels removed and unknown reflux got significantly worse. Dietary restrictions have made her eating disorder louder at times and she is worried about these recommendations post surgery. She was advised to follow a soft diet and eat q 2 hours. ED part is worried about eating \"too often.\" She has lost 50 lbs since February and has noticed this impacting anxiety, fatigue. However, her ED part is very afraid of gaining weight, eventhough her kind mind knows it may happen as she recovers and heals.  Energy Intake: Poor < 50 %  Oral Problems: dysphagia, other, specify: (swallowing difficulty 2/2 reflux/GERD)     Food Intake  Meal 1: banana, eggs  Meal 2: cottage cheese, oatmeal  Meal 3: mac and cheese, yogurt                                                        Food And Nutrient Administration:                        Factors:                         Physical Activities:  Physical Activity  Physical Activity History: not able to run 2/2 GERD symptoms and fatigue           Knowledge Beliefs Attitudes and Behavior       Beliefs and Attitudes  Beliefs and Attitudes: Preoccupation with food, Self-talk/cognitions, Emotions, Preoccupation with weight, Distorted body image, Readiness to change nutrition-related behaviors                               Nutrition Focused Physical Exam:           Energy Needs           Nutrition Diagnosis      Nutrition Diagnosis  Patient has Nutrition Diagnosis: Yes  Nutrition Diagnosis 1: Inadequate energy intake  Related to (1): ED and GERD symptoms  As Evidenced by (1): recall, weight loss    Nutrition " "Interventions/Recommendations   Nutrition Education  Nutrition Education Content: Content related nutrition education  Goals: Discussed awareness of how her ED part likes food restrictions and rules, likes weight loss, etc. Explored challenging these thoughts with healthy mind thoughts: \"I want to have energy, I want to be comfortable again, I want to feel better.\" Education on traditional soft foods diet but pt to get official handout from the doctor who is doing the procedure. For now, focusing on eating q 2 hours (using RR again for support), aiming to eat protein/fat and carb/starch at each eating moment: Protein foods: cottage cheese, yogurt, powdered pb (regular hurts her throat), pudding, mac and cheese, eggs, sergey pudding/rice/tapioca, hummus and Starch foods: mac and cheese, risotto, gnocchi, potatoes, sweet potatoes,squash, oatmeal, banana, applesauce, pudding and jello.  Nutrition Counseling  Strategies: Nutrition counseling based on motivational interviewing strategy, Nutrition counseling based on goal setting strategy  Goals: To try boost or ensure plus 1x/d instead of CIB        There are no Patient Instructions on file for this visit.    Nutrition Monitoring and Evaluation   Food/Nutrient Related History Monitoring  Monitoring and Evaluation Plan: Energy intake, Meal/snack pattern  Knowledge/Beliefs/Attitudes  Monitoring and Evaluation Plan: Beliefs and attitudes, Food and nutrition knowledge, Behavior adherence, Avoidance behavior  Avoidance behavior: Restrictive eating          Time Spent  Prep time on day of patient encounter: 10 minutes  Time spent directly with patient, family or caregiver: 55 minutes  Additional Time Spent on Patient Care Activities: 10 minutes  Documentation Time: 10 minutes  Other Time Spent: 0 minutes  Total: 85 minutes                          "

## 2024-12-12 ENCOUNTER — APPOINTMENT (OUTPATIENT)
Dept: PRIMARY CARE | Facility: CLINIC | Age: 34
End: 2024-12-12
Payer: COMMERCIAL

## 2024-12-12 DIAGNOSIS — Z71.3 DIETARY COUNSELING: Primary | ICD-10-CM

## 2024-12-12 PROCEDURE — 97803 MED NUTRITION INDIV SUBSEQ: CPT | Performed by: DIETITIAN, REGISTERED

## 2024-12-12 NOTE — PROGRESS NOTES
"Nutrition Assessment     Reason for Visit:  Mildred Spencer is a 34 y.o. female who presents for Nutrition Counseling (ED hx, s/p Linx procedure on 12/9/24)    Anthropometrics:            Food And Nutrient Intake:  Food and Nutrient History  Food and Nutrient History: Doing well eating q 1.5-2 hours - viewing as physical therapy for her procedure. ED is loud at times around \"eating too often\" but she has been able to \"act as if\" she doesn't believe these thoughts. Struggling with expanding diet and meeting needs post-op. Mostly eating cottage cheese, apple butter, pumpkin butter, sweet potatoes, cream of wheat, jello and pudding. Has Boost but hasn't had because she wasn't sure if it contained lactose - was advised to avoid lactose for reducing gas production. Worried about eating beans/legumes also for gas production. Advised to eat foods that she can mash with a fork, avoiding seed/nuts and meat for 2 weeks until she has f/u w/ NP at GI office.  Energy Intake: Fair 50-75 %                                                              Food And Nutrient Administration:                        Factors:                         Physical Activities:              Knowledge Beliefs Attitudes and Behavior                                       Nutrition Focused Physical Exam:           Energy Needs           Nutrition Diagnosis      Nutrition Diagnosis  Patient has Nutrition Diagnosis: Yes  Nutrition Diagnosis 1: Inadequate energy intake  Related to (1): ED and GERD symptoms  As Evidenced by (1): recall, weight loss    Nutrition Interventions/Recommendations   Nutrition Education  Nutrition Education Content: Content related nutrition education  Goals: Discussed following guidelines from the doctors who did her surgery - mashing foods, avoiding seeds, high fiber foods, nuts and meat. Discussed ideas for protein: boost (is lactose free), clear boost, powdered peanut butter mixed into foods, eggs, tofu. Discussed banana, " mashed melons, canned peaches and pears - adding into foods she is comfortable with- smashing with a fork. Discussed a carrot and butternut squash soup or similar. Using bone broth for more protein. Explored ideas in detail. To hopefully expand diet in 2 weeks when she has staples removed in GI office.        There are no Patient Instructions on file for this visit.    Nutrition Monitoring and Evaluation   Food/Nutrient Related History Monitoring  Monitoring and Evaluation Plan: Energy intake, Meal/snack pattern  Knowledge/Beliefs/Attitudes  Monitoring and Evaluation Plan: Beliefs and attitudes, Food and nutrition knowledge, Behavior adherence, Avoidance behavior          Time Spent  Prep time on day of patient encounter: 10 minutes  Time spent directly with patient, family or caregiver: 45 minutes  Additional Time Spent on Patient Care Activities: 0 minutes  Documentation Time: 10 minutes  Other Time Spent: 0 minutes  Total: 65 minutes

## 2024-12-19 ENCOUNTER — APPOINTMENT (OUTPATIENT)
Dept: PRIMARY CARE | Facility: CLINIC | Age: 34
End: 2024-12-19
Payer: COMMERCIAL

## 2025-01-13 ENCOUNTER — PROCEDURE VISIT (OUTPATIENT)
Dept: NEUROLOGY | Facility: CLINIC | Age: 35
End: 2025-01-13
Payer: COMMERCIAL

## 2025-01-13 DIAGNOSIS — G43.709 CHRONIC MIGRAINE W/O AURA W/O STATUS MIGRAINOSUS, NOT INTRACTABLE: Primary | ICD-10-CM

## 2025-01-13 PROCEDURE — 2500000004 HC RX 250 GENERAL PHARMACY W/ HCPCS (ALT 636 FOR OP/ED): Mod: JZ | Performed by: STUDENT IN AN ORGANIZED HEALTH CARE EDUCATION/TRAINING PROGRAM

## 2025-01-13 PROCEDURE — 96372 THER/PROPH/DIAG INJ SC/IM: CPT | Performed by: STUDENT IN AN ORGANIZED HEALTH CARE EDUCATION/TRAINING PROGRAM

## 2025-01-13 PROCEDURE — 64615 CHEMODENERV MUSC MIGRAINE: CPT | Performed by: STUDENT IN AN ORGANIZED HEALTH CARE EDUCATION/TRAINING PROGRAM

## 2025-01-13 RX ORDER — PREDNISONE 50 MG/1
1 TABLET ORAL
COMMUNITY
Start: 2025-01-09

## 2025-01-13 RX ADMIN — ONABOTULINUMTOXINA 195 UNITS: 100 INJECTION, POWDER, LYOPHILIZED, FOR SOLUTION INTRADERMAL; INTRAMUSCULAR at 09:07

## 2025-01-13 NOTE — PROGRESS NOTES
Neurology Botulinum Injection    Subjective   Mildred Spencer is an 34 y.o. female here for medical botulinum injection for Chronic migraine w/o aura w/o status migrainosus, not intractable [G43.709].     Had increased headaches after since last Botox, more so after hiatal hernia surgery in early December. Has been having less food intake even before the surgery. Currently having 25/30 HA days per month, however severity is >50% improved.     Botox    Date/Time: 1/13/2025 8:54 AM    Performed by: Chino Garcia DO  Authorized by: Chino Garcia DO    Procedure specific details:       Procedure Note: PREEMPT Botox     Indications: Chronic Migraine     Informed consent was obtained (explaining the procedure and risks and benefits of procedure) from patient: the signed consent form was placed in the medical record.  A time out was completed, verifying correct patient, procedure,site, positioning, and implants or special equipment.     200 units of OnabotulinumtoxinA were reconstituted with saline. Sites of injection were identified via PREEMPT protocol and cleaned with alcohol pads. Using a 30 gauge needle, patient received following injections:     5 units in procerus  5 units in each left and right   10 units divided between 2 sites of L frontalis  10 units divided between 2 sites of R frontalis  20 units divided between 4 sites of L temporalis  20 units divided between 4 sites of R temporalis  15 units divided between 3 sites of L occipitalis  15 units divided between 3 sites of R occipitalis  10 units divided between 2 sites of L paracervical muscles  10 units divided between 2 sites of R paracervical muscles  15 units divided between 3 sites of L trapezius  15 units divided between 3 sites of R trapezius     Additional Sites:  10u L temporalis  10u R temporalis  10u L occipitalis  10u R occipitalis     Used: 195u  Wasted: 5u     There were no complications. Patient was comfortable and left without  complaint.      OnabotulinumtoxinA  Lot #: [id number]  Exp: 4/2027

## 2025-01-24 ENCOUNTER — APPOINTMENT (OUTPATIENT)
Dept: PRIMARY CARE | Facility: CLINIC | Age: 35
End: 2025-01-24
Payer: COMMERCIAL

## 2025-01-31 ENCOUNTER — OFFICE VISIT (OUTPATIENT)
Age: 35
End: 2025-01-31
Payer: COMMERCIAL

## 2025-01-31 VITALS
WEIGHT: 124 LBS | BODY MASS INDEX: 21.97 KG/M2 | HEIGHT: 63 IN | HEART RATE: 74 BPM | DIASTOLIC BLOOD PRESSURE: 64 MMHG | SYSTOLIC BLOOD PRESSURE: 126 MMHG | OXYGEN SATURATION: 99 %

## 2025-01-31 DIAGNOSIS — R05.1 ACUTE COUGH: Primary | ICD-10-CM

## 2025-01-31 PROCEDURE — 3008F BODY MASS INDEX DOCD: CPT | Performed by: FAMILY MEDICINE

## 2025-01-31 PROCEDURE — 99213 OFFICE O/P EST LOW 20 MIN: CPT | Performed by: FAMILY MEDICINE

## 2025-01-31 PROCEDURE — 1036F TOBACCO NON-USER: CPT | Performed by: FAMILY MEDICINE

## 2025-01-31 ASSESSMENT — PATIENT HEALTH QUESTIONNAIRE - PHQ9
1. LITTLE INTEREST OR PLEASURE IN DOING THINGS: NOT AT ALL
2. FEELING DOWN, DEPRESSED OR HOPELESS: NOT AT ALL
SUM OF ALL RESPONSES TO PHQ9 QUESTIONS 1 AND 2: 0

## 2025-01-31 ASSESSMENT — ENCOUNTER SYMPTOMS
WEAKNESS: 0
COUGH: 1
NUMBNESS: 1
SINUS PRESSURE: 1
NAUSEA: 0
FATIGUE: 0
PALPITATIONS: 0

## 2025-01-31 NOTE — PROGRESS NOTES
"Subjective   Patient ID: Mildred Spencer is a 34 y.o. female who presents for Tinnitus (Bilateral ear ringing, pain x 1 mo, recent episode of vision changes lasting 5 minutes ).    HPI   Tinnitus and pressure both ears x 1 month.  Worse over the last week since sick with a cold.  Couple days ago had some changes in her vision lasted about 5 minutes she has not had that since.  Occasional numbness maybe a little bit of pain in her right shin.    Because she was having troubles with swallowing put on 50 mg of prednisone for 10 days.  That did not make a difference in her eustachian tube dysfunction head congestion and numbness pain in her foot.  She was not on the steroids when she had the visual changes.    This point I think she is recovering from a cold and I think it is really just her allergies are acting up which is causing the troubles with her head.  Restart Zyrtec try 12-hour Sudafed twice a day for the next 5 to 7 days.  If things persist in a couple weeks call consider referral to ear nose and throat.    Review of Systems   Constitutional:  Negative for fatigue.   HENT:  Positive for congestion, ear pain, postnasal drip and sinus pressure.    Eyes:  Positive for visual disturbance.   Respiratory:  Positive for cough.    Cardiovascular:  Negative for chest pain and palpitations.   Gastrointestinal:  Negative for nausea.   Neurological:  Positive for numbness. Negative for weakness.       Objective   /64   Pulse 74   Ht 1.6 m (5' 3\")   Wt 56.2 kg (124 lb)   SpO2 99%   BMI 21.97 kg/m²     Physical Exam  Constitutional:       Appearance: Normal appearance.   HENT:      Head: Normocephalic and atraumatic.      Right Ear: Tympanic membrane, ear canal and external ear normal. There is no impacted cerumen.      Left Ear: Tympanic membrane, ear canal and external ear normal. There is no impacted cerumen.      Mouth/Throat:      Mouth: Mucous membranes are moist.      Pharynx: No oropharyngeal exudate " or posterior oropharyngeal erythema.   Cardiovascular:      Rate and Rhythm: Normal rate and regular rhythm.      Heart sounds: Normal heart sounds.   Pulmonary:      Effort: Pulmonary effort is normal.      Breath sounds: Normal breath sounds.   Musculoskeletal:      Cervical back: Normal range of motion. No tenderness.   Lymphadenopathy:      Cervical: No cervical adenopathy.   Skin:     General: Skin is warm and dry.   Neurological:      General: No focal deficit present.      Mental Status: She is alert and oriented to person, place, and time.   Psychiatric:         Mood and Affect: Mood normal.         Behavior: Behavior normal.         Thought Content: Thought content normal.         Judgment: Judgment normal.         Assessment/Plan   Problem List Items Addressed This Visit    None  Visit Diagnoses         Codes    Acute cough    -  Primary R05.1

## 2025-02-13 ENCOUNTER — APPOINTMENT (OUTPATIENT)
Dept: PRIMARY CARE | Facility: CLINIC | Age: 35
End: 2025-02-13
Payer: COMMERCIAL

## 2025-02-13 VITALS — WEIGHT: 120 LBS | HEIGHT: 63 IN | BODY MASS INDEX: 21.26 KG/M2

## 2025-02-13 DIAGNOSIS — Z71.3 DIETARY COUNSELING: Primary | ICD-10-CM

## 2025-02-13 PROCEDURE — 97803 MED NUTRITION INDIV SUBSEQ: CPT | Performed by: DIETITIAN, REGISTERED

## 2025-02-13 NOTE — PROGRESS NOTES
"Nutrition Assessment     Reason for Visit:  Mildred Spencer is a 34 y.o. female who presents for Nutrition Counseling (ED hx, current DE thoughts and behaviors)    Anthropometrics:  Anthropometrics  Height: 160 cm (5' 3\")  Weight: 54.4 kg (120 lb)  BMI (Calculated): 21.26         Food And Nutrient Intake:  Food and Nutrient History  Food and Nutrient History: 6 weeks + post op - eating some more variety of foods but not as much as she would like - partly d/t linx restrictions per GI and partly ED thoughts. She is afraid of gaining weight more than she had been and notices if a meal isn't pre-portioned from \"hungry root,\" she is having a hard time eating it - pushes off until next meal or snack time even. She is struggling with blk/white perfectionist thoughts around food, more BI thoughts, more DE thoughts.  Energy Intake: Fair 50-75 %  Food Intolerance: Dairy 2/2 linx, foods that she can't swallow, she is having to throw up                                                              Factors:                         Physical Activities:              Knowledge Beliefs Attitudes and Behavior       Belief and Attitude Determination  Beliefs and Attitudes: Preoccupation with body shape, Body image disturbance, Preoccupation with body weight, Negative nutrition self talk, Unscientific nutrition beliefs, Readiness to change nutrition related behavior score, Preoccupation with food, Negative emotions about food and nutrition (SOC: action)         Avoidance Behavior  Avoidance: Specific foods, Food groups  Restrictive Eating: Yes  Cause of Avoidance Behavior: AN, food fears after linx                     Nutrition Focused Physical Exam:           Energy Needs  Calculated Energy Needs Using Equations  Height: 160 cm (5' 3\")        Nutrition Diagnosis      Nutrition Diagnosis  Patient has Nutrition Diagnosis: Yes  Nutrition Diagnosis 1: Inadequate energy intake  Related to (1): ED and GERD symptoms, guidelines post " "linx  As Evidenced by (1): recall, weight loss, food fears    Nutrition Interventions/Recommendations   Nutrition Counseling  Nutrition Counseling Strategies : Nutrition counseling based on motivational interviewing strategy, Nutrition counseling based on goal setting strategy  Goals: Discussed increased DE thoughts: naming black and white nature of these, perfectionist thinking. Explored how linx food guidelines have been very black and white, creating more fear around eating. Has lost significant weight: 120# right now. Fear of gaining is louder and aware of ED here. Explored being afraid of gaining weight and also wanting to have more variety, spontenaity and social eating with food - ED recovery is more important than her weight. This is difficult but something she wants to remind herself of. Discussed goal to eating every 2 hours in the day, trying to \"act as if\" she doesn't believe ED thoughts, focusing on reframing health as \"eating right and being thin,\" but health meaning ED recovery, expanding variety in her diet as she can safely and from ED perspective, working on BI and trauma hx with therapist. Discussed some ideas for meals and snacks also.        There are no Patient Instructions on file for this visit.    Nutrition Monitoring and Evaluation   Food and Nutrient Intake  Monitoring and Evaluation Plan: Energy intake, Meal/snack pattern  Meal/Snack Pattern: Food variety  Knowledge Belief Attitude Determination   Monitoring and Evaluation Plan: Food and nutrition knowledge, Belief and attitude determination, Avoidance behavior  Avoidance behavior: Restrictive eating  Anthropometric measurements  Monitoring and Evaluation Plan: Weight change          Time Spent  Prep time on day of patient encounter: 5 minutes  Time spent directly with patient, family or caregiver: 50 minutes  Additional Time Spent on Patient Care Activities: 0 minutes  Documentation Time: 10 minutes  Other Time Spent: 0 minutes  Total: " 65 minutes

## 2025-03-03 ENCOUNTER — APPOINTMENT (OUTPATIENT)
Dept: PRIMARY CARE | Facility: CLINIC | Age: 35
End: 2025-03-03
Payer: COMMERCIAL

## 2025-03-03 DIAGNOSIS — Z71.3 DIETARY COUNSELING: Primary | ICD-10-CM

## 2025-03-03 PROCEDURE — 97803 MED NUTRITION INDIV SUBSEQ: CPT | Performed by: DIETITIAN, REGISTERED

## 2025-03-07 NOTE — PROGRESS NOTES
"Nutrition Assessment     Reason for Visit:  Mildred Spencer is a 34 y.o. female who presents for Nutrition Counseling (ED)    Anthropometrics:            Food And Nutrient Intake:  Food and Nutrient History  Food and Nutrient History: Pt is doing better ignoring ED thoughts about food - having some fast food, trying to eat more variety as tolerated s/p linx. She is working more in the office and notices herself feeling tired, trouble concentrating, trouble finding words and thoughts. Doesn't eat all day when working at times - ED finding \"reasons\" to restrict.  Energy Intake: Fair 50-75 %, Poor < 50 %  Food Intolerance: Dairy 2/2 linx, foods that she can't swallow, she is having to throw up     Food Intake  Meal 1: banana, eggs  Meal 2: cottage cheese, oatmeal  Meal 3: mac and cheese, yogurt  Food Variety: Absent                                                        Factors:                         Physical Activities:              Knowledge Beliefs Attitudes and Behavior       Belief and Attitude Determination  Beliefs and Attitudes: Preoccupation with body shape, Body image disturbance, Preoccupation with body weight, Negative nutrition self talk, Unscientific nutrition beliefs, Readiness to change nutrition related behavior score, Preoccupation with food, Negative emotions about food and nutrition         Avoidance Behavior  Avoidance: Specific foods, Food groups  Restrictive Eating: Yes  Cause of Avoidance Behavior: AN, food fears after linx    Binge Eating and Purging Behavior  Self-Induced Purging Behavior:  (throws up if unable to swallow food 2/2 linx)                Nutrition Focused Physical Exam:           Energy Needs           Nutrition Diagnosis      Nutrition Diagnosis  Patient has Nutrition Diagnosis: Yes  Diagnosis Status (1): Active  Nutrition Diagnosis 1: Inadequate energy intake  Related to (1): ED and GERD symptoms, guidelines post linx  As Evidenced by (1): recall, weight loss, food " fears    Nutrition Interventions/Recommendations   Nutrition Counseling  Nutrition Counseling Strategies : Nutrition counseling based on motivational interviewing strategy, Nutrition counseling based on goal setting strategy  Goals: Explored restriction thoughts, naming ED. Education on energy needs, 70% by 3pm, what happens when she is under-eating. Discussed goals for eating q 1-2 hours, something, [employer]?, protein shake, homemade shake. 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 Discussed nuances here, why eating enough is important to her and her work, life, energy, brain, trouble focusing, trouble finding words at times.        There are no Patient Instructions on file for this visit.    Nutrition Monitoring and Evaluation   Food and Nutrient Intake  Monitoring and Evaluation Plan: Energy intake, Meal/snack pattern  Criteria: goal to eat qq 1-2 hours during office days especially  Meal/Snack Pattern: Food variety  Knowledge Belief Attitude Determination   Monitoring and Evaluation Plan: Food and nutrition knowledge, Belief and attitude determination, Avoidance behavior  Avoidance behavior: Restrictive eating          Time Spent  Prep time on day of patient encounter: 5 minutes  Time spent directly with patient, family or caregiver: 53 minutes  Additional Time Spent on Patient Care Activities: 0 minutes  Documentation Time: 10 minutes  Other Time Spent: 0 minutes  Total: 68 minutes

## 2025-03-24 ENCOUNTER — APPOINTMENT (OUTPATIENT)
Dept: PRIMARY CARE | Facility: CLINIC | Age: 35
End: 2025-03-24
Payer: COMMERCIAL

## 2025-04-14 ENCOUNTER — APPOINTMENT (OUTPATIENT)
Dept: PRIMARY CARE | Facility: CLINIC | Age: 35
End: 2025-04-14
Payer: COMMERCIAL

## 2025-04-14 ENCOUNTER — PROCEDURE VISIT (OUTPATIENT)
Dept: NEUROLOGY | Facility: CLINIC | Age: 35
End: 2025-04-14
Payer: COMMERCIAL

## 2025-04-14 DIAGNOSIS — G43.709 CHRONIC MIGRAINE W/O AURA W/O STATUS MIGRAINOSUS, NOT INTRACTABLE: Primary | ICD-10-CM

## 2025-04-14 DIAGNOSIS — G57.31: ICD-10-CM

## 2025-04-14 PROCEDURE — 99213 OFFICE O/P EST LOW 20 MIN: CPT | Mod: 25 | Performed by: STUDENT IN AN ORGANIZED HEALTH CARE EDUCATION/TRAINING PROGRAM

## 2025-04-14 PROCEDURE — 2500000004 HC RX 250 GENERAL PHARMACY W/ HCPCS (ALT 636 FOR OP/ED): Mod: JZ | Performed by: STUDENT IN AN ORGANIZED HEALTH CARE EDUCATION/TRAINING PROGRAM

## 2025-04-14 PROCEDURE — 64615 CHEMODENERV MUSC MIGRAINE: CPT | Performed by: STUDENT IN AN ORGANIZED HEALTH CARE EDUCATION/TRAINING PROGRAM

## 2025-04-14 PROCEDURE — 99213 OFFICE O/P EST LOW 20 MIN: CPT | Performed by: STUDENT IN AN ORGANIZED HEALTH CARE EDUCATION/TRAINING PROGRAM

## 2025-04-14 RX ORDER — FLUOROMETHOLONE 1 MG/ML
SUSPENSION/ DROPS OPHTHALMIC
COMMUNITY
Start: 2025-04-11

## 2025-04-14 RX ADMIN — ONABOTULINUMTOXINA 195 UNITS: 100 INJECTION, POWDER, LYOPHILIZED, FOR SOLUTION INTRADERMAL; INTRAMUSCULAR at 09:33

## 2025-04-14 NOTE — PROGRESS NOTES
Subjective   Since last Botox having 10/30 HA days per month. Denies any side effects to Botox. Weather changes and menses contributing to headaches.     Notes some sensory changes in her RLE. Describes paresthesias in the lateral aspect of the leg with occasional foot drop. Symptoms have been occurring since starting work. Has been crossing her legs regularly with her R knee being placed on top on L knee.     Botox    Date/Time: 4/14/2025 9:20 AM    Performed by: Chino Garcia DO  Authorized by: Chino Garcia DO    Procedure specific details:      Procedure Note: PREEMPT Botox     Indications: Chronic Migraine     Informed consent was obtained (explaining the procedure and risks and benefits of procedure) from patient: the signed consent form was placed in the medical record.  A time out was completed, verifying correct patient, procedure,site, positioning, and implants or special equipment.     200 units of OnabotulinumtoxinA were reconstituted with saline. Sites of injection were identified via PREEMPT protocol and cleaned with alcohol pads. Using a 30 gauge needle, patient received following injections:     5 units in procerus  5 units in each left and right   10 units divided between 2 sites of L frontalis  10 units divided between 2 sites of R frontalis  20 units divided between 4 sites of L temporalis  20 units divided between 4 sites of R temporalis  15 units divided between 3 sites of L occipitalis  15 units divided between 3 sites of R occipitalis  10 units divided between 2 sites of L paracervical muscles  10 units divided between 2 sites of R paracervical muscles  15 units divided between 3 sites of L trapezius  15 units divided between 3 sites of R trapezius     Additional Sites:  10u L temporalis  10u R temporalis  10u L occipitalis  10u R occipitalis     Used: 195u  Wasted: 5u     There were no complications. Patient was comfortable and left without complaint.      OnabotulinumtoxinA  Lot  #: [id number]  Exp: 4/2027      Assessment   Patient with chronic migraine with aura. Botox has been more effective in controlling headaches. Will continue current headache treatment plan.    Regarding RLE sensory changes, suspect R common peroneal nerve compression with leg crossing. No consistent weakness described. Paresthesias not painful. Discussed conservative measures. If any worsening, would consider EMG and PT.

## 2025-04-21 ENCOUNTER — OFFICE VISIT (OUTPATIENT)
Age: 35
End: 2025-04-21
Payer: COMMERCIAL

## 2025-04-21 VITALS
SYSTOLIC BLOOD PRESSURE: 118 MMHG | BODY MASS INDEX: 22.11 KG/M2 | HEART RATE: 108 BPM | WEIGHT: 124.8 LBS | OXYGEN SATURATION: 98 % | DIASTOLIC BLOOD PRESSURE: 66 MMHG | HEIGHT: 63 IN

## 2025-04-21 DIAGNOSIS — H92.02 LEFT EAR PAIN: Primary | ICD-10-CM

## 2025-04-21 PROCEDURE — 1036F TOBACCO NON-USER: CPT | Performed by: FAMILY MEDICINE

## 2025-04-21 PROCEDURE — 99213 OFFICE O/P EST LOW 20 MIN: CPT | Performed by: FAMILY MEDICINE

## 2025-04-21 PROCEDURE — 3008F BODY MASS INDEX DOCD: CPT | Performed by: FAMILY MEDICINE

## 2025-04-21 ASSESSMENT — ENCOUNTER SYMPTOMS
SINUS PRESSURE: 1
FATIGUE: 1
COUGH: 1
SORE THROAT: 1
SHORTNESS OF BREATH: 0

## 2025-04-21 NOTE — PROGRESS NOTES
"Subjective   Patient ID: Mildred Spencer is a 34 y.o. female who presents for Sore Throat (X 2 days with now left ear pain present- has been around son who was sick recently).    HPI   Is been having a lot of allergy troubles.  Currently taking steroid eyedrops from the eye doctor.  Does have a follow-up.  Worried more about her ear is dull on the left side but no redness or bulging or suppurative fluid  Throat is okay    Ibuprofen as needed.  Continue Zyrtec and Flonase use Sudafed as needed  Things do get significant worse could try steroid pills      Review of Systems   Constitutional:  Positive for fatigue.   HENT:  Positive for congestion, ear pain, sinus pressure and sore throat.    Respiratory:  Positive for cough. Negative for shortness of breath.        Objective   /66   Pulse 108   Ht 1.6 m (5' 3\")   Wt 56.6 kg (124 lb 12.8 oz)   SpO2 98%   BMI 22.11 kg/m²     Physical Exam  Constitutional:       Appearance: Normal appearance.   HENT:      Head: Normocephalic and atraumatic.      Right Ear: Tympanic membrane and ear canal normal. There is no impacted cerumen.      Left Ear: Tympanic membrane and ear canal normal. There is no impacted cerumen.      Mouth/Throat:      Mouth: Mucous membranes are moist.      Pharynx: No oropharyngeal exudate or posterior oropharyngeal erythema.   Cardiovascular:      Rate and Rhythm: Normal rate and regular rhythm.      Heart sounds: Normal heart sounds.   Pulmonary:      Effort: Pulmonary effort is normal.      Breath sounds: Normal breath sounds.   Skin:     General: Skin is warm and dry.   Neurological:      General: No focal deficit present.      Mental Status: She is alert and oriented to person, place, and time.   Psychiatric:         Mood and Affect: Mood normal.         Behavior: Behavior normal.         Thought Content: Thought content normal.         Judgment: Judgment normal.         Assessment/Plan   Problem List Items Addressed This Visit  "   None  Visit Diagnoses         Codes      Left ear pain    -  Primary H92.02

## 2025-07-14 ENCOUNTER — PROCEDURE VISIT (OUTPATIENT)
Dept: NEUROLOGY | Facility: CLINIC | Age: 35
End: 2025-07-14
Payer: COMMERCIAL

## 2025-07-14 DIAGNOSIS — G43.709 CHRONIC MIGRAINE W/O AURA W/O STATUS MIGRAINOSUS, NOT INTRACTABLE: Primary | ICD-10-CM

## 2025-07-14 PROCEDURE — 2500000004 HC RX 250 GENERAL PHARMACY W/ HCPCS (ALT 636 FOR OP/ED): Performed by: STUDENT IN AN ORGANIZED HEALTH CARE EDUCATION/TRAINING PROGRAM

## 2025-07-14 PROCEDURE — 64615 CHEMODENERV MUSC MIGRAINE: CPT | Performed by: STUDENT IN AN ORGANIZED HEALTH CARE EDUCATION/TRAINING PROGRAM

## 2025-07-14 RX ADMIN — ONABOTULINUMTOXINA 195 UNITS: 100 INJECTION, POWDER, LYOPHILIZED, FOR SOLUTION INTRADERMAL; INTRAMUSCULAR at 09:41

## 2025-07-14 NOTE — PROGRESS NOTES
Subjective   Was having 5/30 HA days per month since last Botox. Denies any side effects to Botox or Topiramate. Nurtec for breakthrough headaches.     Has been having some weight loss. Known history of eating disorder, but follows with dietician and counselor. Patient understands the risks with topiramate. Will continue to monitor for weight.     Patient notes that RLE paresthesias improved with less crossing of her legs.     Botox    Date/Time: 7/14/2025 9:25 AM    Performed by: Chino Garcia DO  Authorized by: Chino Garcia DO    Procedure specific details:      Procedure Note: PREEMPT Botox     Indications: Chronic Migraine     Informed consent was obtained (explaining the procedure and risks and benefits of procedure) from patient: the signed consent form was placed in the medical record.  A time out was completed, verifying correct patient, procedure,site, positioning, and implants or special equipment.     200 units of OnabotulinumtoxinA were reconstituted with saline. Sites of injection were identified via PREEMPT protocol and cleaned with alcohol pads. Using a 30 gauge needle, patient received following injections:     5 units in procerus  5 units in each left and right   10 units divided between 2 sites of L frontalis  10 units divided between 2 sites of R frontalis  20 units divided between 4 sites of L temporalis  20 units divided between 4 sites of R temporalis  15 units divided between 3 sites of L occipitalis  15 units divided between 3 sites of R occipitalis  10 units divided between 2 sites of L paracervical muscles  10 units divided between 2 sites of R paracervical muscles  15 units divided between 3 sites of L trapezius  15 units divided between 3 sites of R trapezius     Additional Sites:  10u L temporalis  10u R temporalis  10u L occipitalis  10u R occipitalis     Used: 195u  Wasted: 5u     There were no complications. Patient was comfortable and left without complaint.       OnabotulinumtoxinA  Lot #: [id number]  Exp: 9/2027

## (undated) DEVICE — MASK, FACE, ANESTHESIA, CUSHIONED, TRADITIONAL, SMALL ADULT/CHILD, DISPOSABLE, LF

## (undated) DEVICE — WAND, PROCISE EZ, ENT

## (undated) DEVICE — Device

## (undated) DEVICE — LINE, GAS SAMPLING, .05IN 1D 10FT, M/M CONNECTORS

## (undated) DEVICE — PREP, SKIN, BACTOSHEILD, 4%, 4OZ

## (undated) DEVICE — CIRCUIT, PEDI, EXPANDABLE TO 100 IN

## (undated) DEVICE — SOLUTION, IRRIGATION, STERILE WATER, 1000 ML, POUR BOTTLE

## (undated) DEVICE — SOLUTION, IRRIGATION, SODIUM CHLORIDE 0.9%, 1000 ML, POUR BOTTLE

## (undated) DEVICE — GLOVE, PROTEXIS PI CLASSIC, SZ-7.5, PF, LF

## (undated) DEVICE — STRAP, KNEE AND BODY, SINGLE USE

## (undated) DEVICE — SOLUTION, INJECTION, USP, SODIUM CHLORIDE 0.9%, .9 NACL, 500 ML, BAG